# Patient Record
Sex: MALE | Race: WHITE
[De-identification: names, ages, dates, MRNs, and addresses within clinical notes are randomized per-mention and may not be internally consistent; named-entity substitution may affect disease eponyms.]

---

## 2017-01-26 ENCOUNTER — HOSPITAL ENCOUNTER (OUTPATIENT)
Dept: HOSPITAL 75 - CARD | Age: 82
End: 2017-01-26
Attending: INTERNAL MEDICINE
Payer: MEDICARE

## 2017-01-26 VITALS — DIASTOLIC BLOOD PRESSURE: 81 MMHG | SYSTOLIC BLOOD PRESSURE: 164 MMHG

## 2017-01-26 DIAGNOSIS — R06.09: ICD-10-CM

## 2017-01-26 DIAGNOSIS — I25.10: Primary | ICD-10-CM

## 2017-01-26 DIAGNOSIS — N18.3: ICD-10-CM

## 2017-01-26 DIAGNOSIS — I12.9: ICD-10-CM

## 2017-01-26 DIAGNOSIS — E11.9: ICD-10-CM

## 2017-01-26 DIAGNOSIS — I42.0: ICD-10-CM

## 2017-01-26 PROCEDURE — 78452 HT MUSCLE IMAGE SPECT MULT: CPT

## 2017-01-26 PROCEDURE — 93017 CV STRESS TEST TRACING ONLY: CPT

## 2017-01-26 RX ADMIN — Medication PRN ML: at 08:25

## 2017-01-26 RX ADMIN — Medication PRN ML: at 09:37

## 2017-01-28 NOTE — STRESS TEST
PROCEDURE PHYSICIAN:   ELIAS HINDS

 

RESTING AND POST REGADENOSON TECHNETIUM 99M TETROFOSMIN SPECT CT

IMAGING: 

 

DATE OF PROCEDURE:  

01/26/2017

 

ORDERING PHYSICIAN: 

Dr. Hinds  

 

PRIMARY PHYSICIAN:

Dr. Nelson. 

 

OTHER PHYSICIAN:

Coronary artery disease, shortness of breath. 

 

Baseline images were carried out with injection of 10.33 mCi

technetium 99m tetrofosmin. This was followed by 0.4 mg

regadenoson and 29.5 mCi technetium 99m tetrofosmin for stress

imaging. The electrocardiogram showed sinus rhythm with left

anterior fascicular block at baseline. This did not change

significantly with regadenoson infusion. He tolerated the

procedure well and did not report symptoms. Review of images at

rest and following stress, does not indicate any distinct

perfusion defects consistent with significant myocardial ischemia

or infarction. There is some degree of diaphragmatic attenuation

seen both at rest and following regadenoson infusion. Left

ventricular ejection fraction on gated images is calculated to be

47%. 

 

CONCLUSION:

1.   No evidence of myocardial ischemia or infarction on this

study. 

2.   No distinct regional wall motion abnormalities. 

3.   Left ventricular ejection fraction is calculated to be 47%. 

 

Job ID: [id number]

Dictated Date: 01/27/2017 11:49:23 

Transcription Date: 01/28/2017 11:53:44 / anthony

## 2017-02-27 ENCOUNTER — HOSPITAL ENCOUNTER (OUTPATIENT)
Dept: HOSPITAL 75 - RT | Age: 82
End: 2017-02-27
Attending: INTERNAL MEDICINE
Payer: MEDICARE

## 2017-02-27 DIAGNOSIS — E66.09: ICD-10-CM

## 2017-02-27 DIAGNOSIS — I12.9: ICD-10-CM

## 2017-02-27 DIAGNOSIS — I42.0: ICD-10-CM

## 2017-02-27 DIAGNOSIS — N18.3: ICD-10-CM

## 2017-02-27 DIAGNOSIS — E11.9: ICD-10-CM

## 2017-02-27 DIAGNOSIS — I25.10: ICD-10-CM

## 2017-02-27 DIAGNOSIS — E78.4: Primary | ICD-10-CM

## 2017-02-27 DIAGNOSIS — R06.02: ICD-10-CM

## 2017-02-27 PROCEDURE — 94726 PLETHYSMOGRAPHY LUNG VOLUMES: CPT

## 2017-02-27 PROCEDURE — 94640 AIRWAY INHALATION TREATMENT: CPT

## 2017-02-27 PROCEDURE — 94060 EVALUATION OF WHEEZING: CPT

## 2017-02-27 PROCEDURE — 94729 DIFFUSING CAPACITY: CPT
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
## 2017-05-11 ENCOUNTER — HOSPITAL ENCOUNTER (OUTPATIENT)
Dept: HOSPITAL 75 - SLEEP | Age: 82
Discharge: HOME | End: 2017-05-11
Attending: NURSE PRACTITIONER
Payer: MEDICARE

## 2017-05-11 DIAGNOSIS — G47.33: Primary | ICD-10-CM

## 2019-06-18 ENCOUNTER — HOSPITAL ENCOUNTER (OUTPATIENT)
Dept: HOSPITAL 75 - CARD | Age: 84
End: 2019-06-18
Attending: INTERNAL MEDICINE
Payer: MEDICARE

## 2019-06-18 VITALS — WEIGHT: 241 LBS | HEIGHT: 68 IN | BODY MASS INDEX: 36.53 KG/M2

## 2019-06-18 VITALS — SYSTOLIC BLOOD PRESSURE: 177 MMHG | DIASTOLIC BLOOD PRESSURE: 94 MMHG

## 2019-06-18 VITALS — SYSTOLIC BLOOD PRESSURE: 213 MMHG | DIASTOLIC BLOOD PRESSURE: 99 MMHG

## 2019-06-18 DIAGNOSIS — N18.3: ICD-10-CM

## 2019-06-18 DIAGNOSIS — E11.22: ICD-10-CM

## 2019-06-18 DIAGNOSIS — I42.0: ICD-10-CM

## 2019-06-18 DIAGNOSIS — I77.89: ICD-10-CM

## 2019-06-18 DIAGNOSIS — I12.9: ICD-10-CM

## 2019-06-18 DIAGNOSIS — E78.5: ICD-10-CM

## 2019-06-18 DIAGNOSIS — I25.10: Primary | ICD-10-CM

## 2019-06-18 DIAGNOSIS — R06.09: ICD-10-CM

## 2019-06-18 PROCEDURE — 93017 CV STRESS TEST TRACING ONLY: CPT

## 2019-06-18 PROCEDURE — 78452 HT MUSCLE IMAGE SPECT MULT: CPT

## 2019-06-18 NOTE — STRESS TEST
DATE OF SERVICE:  06/18/2019



RESTING AND POST REGADENOSON TECHNETIUM-99M TETROFOSMIN SPECT CT IMAGING



ORDERING PHYSICIAN:

Edgardo Hinds MD, MA, FACP, FACC.



CLINICAL DIAGNOSES:

Coronary artery disease, nonischemic cardiomyopathy, diabetes, chronic kidney

disease, shortness of breath.



Baseline images were carried out after injection of 10.84 mCi of technetium-99m

Tetrofosmin.  This was followed by 0.4 mg regadenoson and 33 mCi of

technetium-99m Tetrofosmin for stress imaging.  The electrocardiogram showed

sinus rhythm and left bundle branch block at baseline.  Isolated premature

ventricular contractions were seen, sometimes in a bigeminal pattern.  There

were no runs of ventricular tachycardia.  The patient tolerated the procedure

well.  Review of images at rest and following stress does not indicate any

significant perfusion defects consistent with myocardial ischemia or infarction.

 Gated images show mild global hypokinesis.  Left ventricular ejection fraction

is calculated to be 44%.



CONCLUSIONS:

1.  No evidence of myocardial ischemia or infarction on this study.

2.  Mild impairment of global left ventricular systolic function with a

calculated ejection fraction of 44%.

3.  No regional wall motion abnormality is seen on this study.





Job ID: [id number]

DocumentID: [id number]

Dictated Date:  06/18/2019 19:07:21

Transcription Date: 06/18/2019 21:32:11

Dictated By: EDGARDO HINDS MD, MA, FACP, FACC,

## 2019-12-09 ENCOUNTER — HOSPITAL ENCOUNTER (OUTPATIENT)
Dept: HOSPITAL 75 - CARD | Age: 84
End: 2019-12-09
Attending: INTERNAL MEDICINE
Payer: MEDICARE

## 2019-12-09 DIAGNOSIS — I65.29: ICD-10-CM

## 2019-12-09 DIAGNOSIS — I13.10: ICD-10-CM

## 2019-12-09 DIAGNOSIS — E78.5: ICD-10-CM

## 2019-12-09 DIAGNOSIS — G47.33: ICD-10-CM

## 2019-12-09 DIAGNOSIS — I25.10: Primary | ICD-10-CM

## 2019-12-09 DIAGNOSIS — I43: ICD-10-CM

## 2019-12-09 DIAGNOSIS — N18.3: ICD-10-CM

## 2019-12-09 DIAGNOSIS — E11.22: ICD-10-CM

## 2019-12-09 PROCEDURE — 93306 TTE W/DOPPLER COMPLETE: CPT

## 2021-03-19 ENCOUNTER — HOSPITAL ENCOUNTER (OUTPATIENT)
Dept: HOSPITAL 75 - CARD | Age: 86
End: 2021-03-19
Attending: INTERNAL MEDICINE
Payer: MEDICARE

## 2021-03-19 DIAGNOSIS — I42.0: Primary | ICD-10-CM

## 2021-03-19 PROCEDURE — 93306 TTE W/DOPPLER COMPLETE: CPT

## 2021-03-23 ENCOUNTER — HOSPITAL ENCOUNTER (OUTPATIENT)
Dept: HOSPITAL 75 - CARD | Age: 86
End: 2021-03-23
Attending: INTERNAL MEDICINE
Payer: MEDICARE

## 2021-03-23 VITALS — DIASTOLIC BLOOD PRESSURE: 95 MMHG | SYSTOLIC BLOOD PRESSURE: 187 MMHG

## 2021-03-23 VITALS — BODY MASS INDEX: 39.79 KG/M2 | WEIGHT: 253.53 LBS | HEIGHT: 66.93 IN

## 2021-03-23 DIAGNOSIS — R06.09: Primary | ICD-10-CM

## 2021-03-23 PROCEDURE — 78452 HT MUSCLE IMAGE SPECT MULT: CPT

## 2021-03-23 PROCEDURE — 93017 CV STRESS TEST TRACING ONLY: CPT

## 2021-03-23 RX ADMIN — Medication PRN ML: at 09:42

## 2021-03-23 RX ADMIN — Medication PRN ML: at 08:13

## 2021-05-27 ENCOUNTER — HOSPITAL ENCOUNTER (OUTPATIENT)
Dept: HOSPITAL 75 - PREOP | Age: 86
LOS: 1 days | Discharge: HOME | End: 2021-05-28
Attending: SURGERY
Payer: MEDICARE

## 2021-05-27 VITALS — BODY MASS INDEX: 40.03 KG/M2 | HEIGHT: 67.99 IN | WEIGHT: 264.11 LBS

## 2021-05-27 DIAGNOSIS — Z01.818: Primary | ICD-10-CM

## 2021-09-08 ENCOUNTER — HOSPITAL ENCOUNTER (OUTPATIENT)
Dept: HOSPITAL 75 - LAB | Age: 86
End: 2021-09-08
Attending: SURGERY
Payer: MEDICARE

## 2021-09-08 ENCOUNTER — HOSPITAL ENCOUNTER (OUTPATIENT)
Dept: HOSPITAL 75 - WOUNDCARE | Age: 86
End: 2021-09-08
Attending: SURGERY
Payer: MEDICARE

## 2021-09-08 DIAGNOSIS — L92.8: ICD-10-CM

## 2021-09-08 DIAGNOSIS — L97.212: ICD-10-CM

## 2021-09-08 DIAGNOSIS — I89.0: Primary | ICD-10-CM

## 2021-09-08 DIAGNOSIS — I70.232: ICD-10-CM

## 2021-09-08 DIAGNOSIS — E11.622: ICD-10-CM

## 2021-09-08 LAB
ALBUMIN SERPL-MCNC: 3.7 GM/DL (ref 3.2–4.5)
ALP SERPL-CCNC: 55 U/L (ref 40–136)
ALT SERPL-CCNC: 13 U/L (ref 0–55)
BASOPHILS # BLD AUTO: 0 10^3/UL (ref 0–0.1)
BASOPHILS NFR BLD AUTO: 0 % (ref 0–10)
BILIRUB SERPL-MCNC: 0.5 MG/DL (ref 0.1–1)
BUN/CREAT SERPL: 14
CALCIUM SERPL-MCNC: 9.2 MG/DL (ref 8.5–10.1)
CHLORIDE SERPL-SCNC: 97 MMOL/L (ref 98–107)
CO2 SERPL-SCNC: 28 MMOL/L (ref 21–32)
CREAT SERPL-MCNC: 1.52 MG/DL (ref 0.6–1.3)
EOSINOPHIL # BLD AUTO: 0 10^3/UL (ref 0–0.3)
EOSINOPHIL NFR BLD AUTO: 1 % (ref 0–10)
GFR SERPLBLD BASED ON 1.73 SQ M-ARVRAT: 44 ML/MIN
GLUCOSE SERPL-MCNC: 195 MG/DL (ref 70–105)
HCT VFR BLD CALC: 39 % (ref 40–54)
HGB BLD-MCNC: 13.2 G/DL (ref 13.3–17.7)
LYMPHOCYTES # BLD AUTO: 1.1 10^3/UL (ref 1–4)
LYMPHOCYTES NFR BLD AUTO: 20 % (ref 12–44)
MANUAL DIFFERENTIAL PERFORMED BLD QL: NO
MCH RBC QN AUTO: 30 PG (ref 25–34)
MCHC RBC AUTO-ENTMCNC: 34 G/DL (ref 32–36)
MCV RBC AUTO: 88 FL (ref 80–99)
MONOCYTES # BLD AUTO: 0.7 10^3/UL (ref 0–1)
MONOCYTES NFR BLD AUTO: 13 % (ref 0–12)
NEUTROPHILS # BLD AUTO: 3.6 10^3/UL (ref 1.8–7.8)
NEUTROPHILS NFR BLD AUTO: 66 % (ref 42–75)
PLATELET # BLD: 143 10^3/UL (ref 130–400)
PMV BLD AUTO: 9.6 FL (ref 9–12.2)
POTASSIUM SERPL-SCNC: 3.7 MMOL/L (ref 3.6–5)
PROT SERPL-MCNC: 7.2 GM/DL (ref 6.4–8.2)
SODIUM SERPL-SCNC: 134 MMOL/L (ref 135–145)
WBC # BLD AUTO: 5.5 10^3/UL (ref 4.3–11)

## 2021-09-08 PROCEDURE — 36415 COLL VENOUS BLD VENIPUNCTURE: CPT

## 2021-09-08 PROCEDURE — 85025 COMPLETE CBC W/AUTO DIFF WBC: CPT

## 2021-09-08 PROCEDURE — 99214 OFFICE O/P EST MOD 30 MIN: CPT

## 2021-09-08 PROCEDURE — 83036 HEMOGLOBIN GLYCOSYLATED A1C: CPT

## 2021-09-08 PROCEDURE — 80053 COMPREHEN METABOLIC PANEL: CPT

## 2021-09-16 ENCOUNTER — HOSPITAL ENCOUNTER (OUTPATIENT)
Dept: HOSPITAL 75 - WOUNDCARE | Age: 86
End: 2021-09-16
Attending: SURGERY
Payer: MEDICARE

## 2021-09-16 DIAGNOSIS — I89.0: Primary | ICD-10-CM

## 2021-09-16 DIAGNOSIS — E11.52: ICD-10-CM

## 2021-09-16 DIAGNOSIS — E11.622: ICD-10-CM

## 2021-09-16 DIAGNOSIS — L97.211: ICD-10-CM

## 2021-09-16 PROCEDURE — 99212 OFFICE O/P EST SF 10 MIN: CPT

## 2021-09-21 ENCOUNTER — HOSPITAL ENCOUNTER (OUTPATIENT)
Dept: HOSPITAL 75 - WOUNDCARE | Age: 86
End: 2021-09-21
Attending: SURGERY
Payer: MEDICARE

## 2021-09-21 DIAGNOSIS — L97.211: ICD-10-CM

## 2021-09-21 DIAGNOSIS — E11.52: ICD-10-CM

## 2021-09-21 DIAGNOSIS — E11.622: ICD-10-CM

## 2021-09-21 DIAGNOSIS — I89.0: Primary | ICD-10-CM

## 2021-09-21 PROCEDURE — 99212 OFFICE O/P EST SF 10 MIN: CPT

## 2022-01-10 ENCOUNTER — HOSPITAL ENCOUNTER (INPATIENT)
Dept: HOSPITAL 75 - ER | Age: 87
LOS: 7 days | Discharge: HOME HEALTH | DRG: 871 | End: 2022-01-17
Attending: INTERNAL MEDICINE | Admitting: INTERNAL MEDICINE
Payer: MEDICARE

## 2022-01-10 VITALS — HEIGHT: 67.99 IN | WEIGHT: 250.45 LBS | BODY MASS INDEX: 37.96 KG/M2

## 2022-01-10 DIAGNOSIS — Z20.822: ICD-10-CM

## 2022-01-10 DIAGNOSIS — A41.1: Primary | ICD-10-CM

## 2022-01-10 DIAGNOSIS — Z86.73: ICD-10-CM

## 2022-01-10 DIAGNOSIS — E03.9: ICD-10-CM

## 2022-01-10 DIAGNOSIS — Z87.891: ICD-10-CM

## 2022-01-10 DIAGNOSIS — I12.9: ICD-10-CM

## 2022-01-10 DIAGNOSIS — I65.23: ICD-10-CM

## 2022-01-10 DIAGNOSIS — H54.7: ICD-10-CM

## 2022-01-10 DIAGNOSIS — G47.33: ICD-10-CM

## 2022-01-10 DIAGNOSIS — E78.00: ICD-10-CM

## 2022-01-10 DIAGNOSIS — F32.A: ICD-10-CM

## 2022-01-10 DIAGNOSIS — I25.10: ICD-10-CM

## 2022-01-10 DIAGNOSIS — J18.9: ICD-10-CM

## 2022-01-10 DIAGNOSIS — I87.2: ICD-10-CM

## 2022-01-10 DIAGNOSIS — I21.A1: ICD-10-CM

## 2022-01-10 DIAGNOSIS — K21.9: ICD-10-CM

## 2022-01-10 DIAGNOSIS — E11.65: ICD-10-CM

## 2022-01-10 DIAGNOSIS — E78.5: ICD-10-CM

## 2022-01-10 DIAGNOSIS — N18.30: ICD-10-CM

## 2022-01-10 DIAGNOSIS — Z79.4: ICD-10-CM

## 2022-01-10 DIAGNOSIS — E11.22: ICD-10-CM

## 2022-01-10 DIAGNOSIS — N39.0: ICD-10-CM

## 2022-01-10 LAB
ALBUMIN SERPL-MCNC: 3.8 GM/DL (ref 3.2–4.5)
ALP SERPL-CCNC: 73 U/L (ref 40–136)
ALT SERPL-CCNC: 12 U/L (ref 0–55)
APTT BLD: 41 SEC (ref 24–35)
APTT PPP: YELLOW S
BACTERIA #/AREA URNS HPF: (no result) /HPF
BASOPHILS # BLD AUTO: 0 10^3/UL (ref 0–0.1)
BASOPHILS NFR BLD AUTO: 0 % (ref 0–10)
BILIRUB SERPL-MCNC: 1.6 MG/DL (ref 0.1–1)
BILIRUB UR QL STRIP: NEGATIVE
BUN/CREAT SERPL: 14
CALCIUM SERPL-MCNC: 9.3 MG/DL (ref 8.5–10.1)
CHLORIDE SERPL-SCNC: 93 MMOL/L (ref 98–107)
CO2 SERPL-SCNC: 25 MMOL/L (ref 21–32)
CREAT SERPL-MCNC: 1.43 MG/DL (ref 0.6–1.3)
D DIMER PPP FEU-MCNC: 1.76 UG/ML (ref 0–0.49)
EOSINOPHIL # BLD AUTO: 0 10^3/UL (ref 0–0.3)
EOSINOPHIL NFR BLD AUTO: 0 % (ref 0–10)
FIBRINOGEN PPP-MCNC: CLEAR MG/DL
GFR SERPLBLD BASED ON 1.73 SQ M-ARVRAT: 47 ML/MIN
GLUCOSE SERPL-MCNC: 266 MG/DL (ref 70–105)
GLUCOSE UR STRIP-MCNC: (no result) MG/DL
HCT VFR BLD CALC: 40 % (ref 40–54)
HGB BLD-MCNC: 13.6 G/DL (ref 13.3–17.7)
INR PPP: 1.2 (ref 0.8–1.4)
KETONES UR QL STRIP: NEGATIVE
LEUKOCYTE ESTERASE UR QL STRIP: NEGATIVE
LYMPHOCYTES # BLD AUTO: 2 10^3/UL (ref 1–4)
LYMPHOCYTES NFR BLD AUTO: 17 % (ref 12–44)
MANUAL DIFFERENTIAL PERFORMED BLD QL: NO
MCH RBC QN AUTO: 30 PG (ref 25–34)
MCHC RBC AUTO-ENTMCNC: 34 G/DL (ref 32–36)
MCV RBC AUTO: 87 FL (ref 80–99)
MONOCYTES # BLD AUTO: 1.2 10^3/UL (ref 0–1)
MONOCYTES NFR BLD AUTO: 11 % (ref 0–12)
NEUTROPHILS # BLD AUTO: 8.1 10^3/UL (ref 1.8–7.8)
NEUTROPHILS NFR BLD AUTO: 71 % (ref 42–75)
NITRITE UR QL STRIP: NEGATIVE
PH UR STRIP: 6 [PH] (ref 5–9)
PLATELET # BLD: 182 10^3/UL (ref 130–400)
PMV BLD AUTO: 10.1 FL (ref 9–12.2)
POTASSIUM SERPL-SCNC: 4.3 MMOL/L (ref 3.6–5)
PROT SERPL-MCNC: 8.1 GM/DL (ref 6.4–8.2)
PROT UR QL STRIP: (no result)
PROTHROMBIN TIME: 15.2 SEC (ref 12.2–14.7)
RBC #/AREA URNS HPF: (no result) /HPF
SODIUM SERPL-SCNC: 128 MMOL/L (ref 135–145)
SP GR UR STRIP: 1.02 (ref 1.02–1.02)
SQUAMOUS #/AREA URNS HPF: (no result) /HPF
T4 FREE SERPL-MCNC: 1.31 NG/DL (ref 0.7–1.48)
WBC # BLD AUTO: 11.5 10^3/UL (ref 4.3–11)

## 2022-01-10 PROCEDURE — 80053 COMPREHEN METABOLIC PANEL: CPT

## 2022-01-10 PROCEDURE — 87636 SARSCOV2 & INF A&B AMP PRB: CPT

## 2022-01-10 PROCEDURE — 71045 X-RAY EXAM CHEST 1 VIEW: CPT

## 2022-01-10 PROCEDURE — 83605 ASSAY OF LACTIC ACID: CPT

## 2022-01-10 PROCEDURE — 82947 ASSAY GLUCOSE BLOOD QUANT: CPT

## 2022-01-10 PROCEDURE — 85730 THROMBOPLASTIN TIME PARTIAL: CPT

## 2022-01-10 PROCEDURE — 70450 CT HEAD/BRAIN W/O DYE: CPT

## 2022-01-10 PROCEDURE — 93041 RHYTHM ECG TRACING: CPT

## 2022-01-10 PROCEDURE — 80061 LIPID PANEL: CPT

## 2022-01-10 PROCEDURE — 94760 N-INVAS EAR/PLS OXIMETRY 1: CPT

## 2022-01-10 PROCEDURE — 87081 CULTURE SCREEN ONLY: CPT

## 2022-01-10 PROCEDURE — 51701 INSERT BLADDER CATHETER: CPT

## 2022-01-10 PROCEDURE — 85025 COMPLETE CBC W/AUTO DIFF WBC: CPT

## 2022-01-10 PROCEDURE — 72170 X-RAY EXAM OF PELVIS: CPT

## 2022-01-10 PROCEDURE — 86141 C-REACTIVE PROTEIN HS: CPT

## 2022-01-10 PROCEDURE — 36415 COLL VENOUS BLD VENIPUNCTURE: CPT

## 2022-01-10 PROCEDURE — 85610 PROTHROMBIN TIME: CPT

## 2022-01-10 PROCEDURE — 84100 ASSAY OF PHOSPHORUS: CPT

## 2022-01-10 PROCEDURE — 85379 FIBRIN DEGRADATION QUANT: CPT

## 2022-01-10 PROCEDURE — 84145 PROCALCITONIN (PCT): CPT

## 2022-01-10 PROCEDURE — 94761 N-INVAS EAR/PLS OXIMETRY MLT: CPT

## 2022-01-10 PROCEDURE — 93306 TTE W/DOPPLER COMPLETE: CPT

## 2022-01-10 PROCEDURE — 83880 ASSAY OF NATRIURETIC PEPTIDE: CPT

## 2022-01-10 PROCEDURE — 87077 CULTURE AEROBIC IDENTIFY: CPT

## 2022-01-10 PROCEDURE — 84484 ASSAY OF TROPONIN QUANT: CPT

## 2022-01-10 PROCEDURE — 87186 SC STD MICRODIL/AGAR DIL: CPT

## 2022-01-10 PROCEDURE — 84443 ASSAY THYROID STIM HORMONE: CPT

## 2022-01-10 PROCEDURE — 87088 URINE BACTERIA CULTURE: CPT

## 2022-01-10 PROCEDURE — 83735 ASSAY OF MAGNESIUM: CPT

## 2022-01-10 PROCEDURE — 93005 ELECTROCARDIOGRAM TRACING: CPT

## 2022-01-10 PROCEDURE — 81000 URINALYSIS NONAUTO W/SCOPE: CPT

## 2022-01-10 PROCEDURE — 72125 CT NECK SPINE W/O DYE: CPT

## 2022-01-10 PROCEDURE — 87040 BLOOD CULTURE FOR BACTERIA: CPT

## 2022-01-10 PROCEDURE — 84439 ASSAY OF FREE THYROXINE: CPT

## 2022-01-10 PROCEDURE — 94640 AIRWAY INHALATION TREATMENT: CPT

## 2022-01-10 NOTE — DIAGNOSTIC IMAGING REPORT
INDICATION: Confused, cough.



COMPARISON: None.



FINDINGS: Single view of the chest demonstrates cardiac

enlargement without overt pulmonary edema. There is an infiltrate

in the left base. The right lung is clear. There is no

pneumothorax. Osseous structures normal.



IMPRESSION: Infiltrate in the left lung base. 



Dictated by: 



  Dictated on workstation # [id number]

## 2022-01-10 NOTE — DIAGNOSTIC IMAGING REPORT
INDICATION: Confused, fall.



COMPARISON: None 



FINDINGS: Single view of the pelvis demonstrates no fracture or

dislocation. Articular surfaces are age-appropriate. Mild

constipation is present.



IMPRESSION: Negative pelvis.



Dictated by: 



  Dictated on workstation # [id number]

## 2022-01-10 NOTE — ED NEUROLOGICAL PROBLEM
General


Chief Complaint:  Neuro-Stroke Like Symptoms


Stated Complaint:  FEVER,COUGH,SOB,SLURRED SPEECH,WEAKNESS


Nursing Triage Note:  


ARRIVED VIA WC TO ROOM 03.  WIFE STATES HE WOKE UP THIS AM ET NOT THIS AM NOT 


HIMSELF.  CONFUSED, FEVER, COUGH. WIFE REPORTS HE FELL TODAY ET UNKNOWN IF HE 


HIT HIS HEAD.


Source:  patient


Exam Limitations:  no limitations





History of Present Illness


Date Seen by Provider:  Curt 10, 2022


Time Seen by Provider:  18:24


Initial Comments


This 86-year-old gentleman presents to the emergency room accompanied by his 

wife with concerns about fever, chronic cough, shortness of breath, slurred 

speech, generalized weakness, and fall in the home.  He woke this morning with 

slurred speech that his wife noted when she made breakfast around 08 30.  She 

notes that he later had a temperature of 101.4.  He is afebrile right now but 

feels warm to the touch.  He is normally alert, oriented, and conversational.  

Today he seems confused and trails off during conversation.  He is disoriented 

to month and year.  He answers some questions but not others.  When asked where 

he is he states "this [employer], [facility]".  He is able to move 

all 4 extremities but seems diffusely weak.  He has severe edema from his feet 

up through his hips.  He denies any pain.  Wife is unclear about the nature of 

his fall.  She thinks he may have hurt his hip but he denies any pain or 

tenderness there.  Fingerstick blood sugar was 265.  He has mild systolic heart 

failure by echo history.





Allergies and Home Medications


Allergies


Coded Allergies:  


     No Known Drug Allergies (Unverified , 10/14/10)





Patient Home Medication List


Home Medication List Reviewed:  Yes


Clopidogrel Bisulfate (Clopidogrel) 75 Mg Tablet, 75 MG PO DAILY, (Reported)


   Entered as Reported by: JULIE A IBEH on 21


Cyanocobalamin (Vitamin B-12) (Vitamin B12) 2,500 Mcg Tablet, 2,500 MCG PO 

DAILY, (Reported)


   Entered as Reported by: JULIE A IBEH on 21


Enalapril Maleate (Enalapril Maleate) 5 Mg Tablet, 5 MG PO BID, (Reported)


   Entered as Reported by: JULIE A IBEH on 21


Furosemide (Lasix) 80 Mg Tablet, 80 MG PO DAILY, (Reported)


   Entered as Reported by: JULIE A IBEH on 21


Insulin Aspart (Novolog Flexpen) 300 Units/3 Ml Solution, 25 UNITS SQ AC, 

(Reported)


   Entered as Reported by: JULIE A IBEH on 21


Insulin Glargine,Hum.rec.anlog (Lantus Solostar) 100 Unit/1 Ml Insuln.pen, 32 

UNIT SQ HS, (Reported)


   Entered as Reported by: JULIE A IBEH on 21


Levothyroxine Sodium (Levothyroxine) 150 Mcg Capsule, 150 MCG PO DAILY, 

(Reported)


   Entered as Reported by: JULIE A IBEH on 21


Meclizine HCl (Meclizine HCl) 25 Mg Tablet, 25 MG PO TID, (Reported)


   Entered as Reported by: JULIE A IBEH on 21


Metoprolol Succinate (Metoprolol Succinate) 25 Mg Tab.er.24h, 25 MG PO DAILY, 

(Reported)


   Entered as Reported by: JULIE A IBEH on 21


Simvastatin (Simvastatin) 40 Mg Tablet, 40 MG PO HS, (Reported)


   Entered as Reported by: JULIE A IBEH on 21


Spironolactone (Spironolactone) 50 Mg Tablet, 50 MG PO BID, (Reported)


   Entered as Reported by: JULIE A IBEH on 21


Tamsulosin HCl (Flomax) 0.4 Mg Cap, 0.4 MG PO DAILY, (Reported)


   Entered as Reported by: JULIE A IBEH on 21





Review of Systems


Review of Systems


Constitutional:  see HPI


Eyes:  No Symptoms Reported


Ears, Nose, Mouth, Throat:  no symptoms reported


Respiratory:  no symptoms reported


Cardiovascular:  see HPI


Gastrointestinal:  no symptoms reported


Genitourinary:  no symptoms reported


Musculoskeletal:  no symptoms reported


Skin:  no symptoms reported


Psychiatric/Neurological:  See HPI


Endocrine:  No Symptoms Reported


Hematologic/Lymphatic:  No Symptoms Reported





Past Medical-Social-Family Hx


Patient Social History


Tobacco Use?:  No


Substance use?:  No


Alcohol Use?:  No





Immunizations Up To Date


COVID19 Vaccine :  MODERNA





Past Medical History


Surgeries:  Yes


Abdominal, Gallbladder, Orthopedic


Respiratory:  Yes


Chronic Bronchitis


Cardiac:  Yes (Mild systolic heart failure)


Chronic Edema/Swelling, Coronary Artery Disease, High Cholesterol, Hypertension


Neurological:  Yes


TIA


Reproductive Disorders:  No


Genitourinary:  Yes


Renal Failure


Gastrointestinal:  No


Musculoskeletal:  No


Endocrine:  Yes


Diabetes, Insulin dep


Cancer:  No


Psychosocial:  No


Integumentary:  No





Physical Exam


Vital Signs





Vital Signs - First Documented








 1/10/22





 18:26


 


Temp 37.0


 


Pulse 111


 


Resp 16


 


B/P (MAP) 190/102 (131)


 


Pulse Ox 94





Capillary Refill : Less Than 3 Seconds


Height, Weight, BMI


Height: 5'8.00"


Weight: 241lbs. 0.0oz. 109.289202gz; 38.00 BMI


Method:Stated


General Appearance:  WD/WN, no apparent distress, obese


HEENT:  PERRL/EOMI, other (Oropharynx dry)


Neck:  normal inspection


Respiratory:  lungs clear, normal breath sounds, no respiratory distress


Cardiovascular:  regular rate, rhythm, no murmur, other (Severe lower extremity 

edema from his feet through his hips)


Gastrointestinal:  normal bowel sounds, soft, distended, tenderness (Slight 

generalized tenderness, no acute abdomen)


Extremities:  swelling, other (Severe tight lower extremity edema from his feet 

through his hip)


Neurologic/Psychiatric:  alert, other (Patient is alert and answers some 

questions.  He often trails off before finishing a thought.  He seems somnolent.

 He is disoriented.  He moves all 4 extremities and has no obvious focal deficit

but seems generally weak.)


Crainal Nerves:  normal hearing, normal speech, PERRL


Skin:  normal color, warm/dry





Focused Exam


Lactate Level


1/10/22 18:40: Lactic Acid Level 1.70





Lactic Acid Level





Laboratory Tests








Test


 1/10/22


18:40


 


Lactic Acid Level


 1.70 MMOL/L


(0.50-2.00)











Progress/Results/Core Measures


Results/Orders


Lab Results





Laboratory Tests








Test


 1/10/22


18:37 1/10/22


18:40 1/10/22


18:47 Range/Units


 


 


White Blood Count


 11.5 H


 


 


 4.3-11.0


10^3/uL


 


Red Blood Count


 4.59 


 


 


 4.30-5.52


10^6/uL


 


Hemoglobin 13.6    13.3-17.7  g/dL


 


Hematocrit 40    40-54  %


 


Mean Corpuscular Volume 87    80-99  fL


 


Mean Corpuscular Hemoglobin 30    25-34  pg


 


Mean Corpuscular Hemoglobin


Concent 34 


 


 


 32-36  g/dL





 


Red Cell Distribution Width 12.2    10.0-14.5  %


 


Platelet Count


 182 


 


 


 130-400


10^3/uL


 


Mean Platelet Volume 10.1    9.0-12.2  fL


 


Immature Granulocyte % (Auto) 1     %


 


Neutrophils (%) (Auto) 71    42-75  %


 


Lymphocytes (%) (Auto) 17    12-44  %


 


Monocytes (%) (Auto) 11    0-12  %


 


Eosinophils (%) (Auto) 0    0-10  %


 


Basophils (%) (Auto) 0    0-10  %


 


Neutrophils # (Auto)


 8.1 H


 


 


 1.8-7.8


10^3/uL


 


Lymphocytes # (Auto)


 2.0 


 


 


 1.0-4.0


10^3/uL


 


Monocytes # (Auto)


 1.2 H


 


 


 0.0-1.0


10^3/uL


 


Eosinophils # (Auto)


 0.0 


 


 


 0.0-0.3


10^3/uL


 


Basophils # (Auto)


 0.0 


 


 


 0.0-0.1


10^3/uL


 


Immature Granulocyte # (Auto)


 0.1 


 


 


 0.0-0.1


10^3/uL


 


Prothrombin Time 15.2 H   12.2-14.7  SEC


 


INR Comment 1.2    0.8-1.4  


 


Activated Partial


Thromboplast Time 41 H


 


 


 24-35  SEC





 


D-Dimer


 1.76 H


 


 


 0.00-0.49


UG/ML


 


Sodium Level 128 L   135-145  MMOL/L


 


Potassium Level 4.3    3.6-5.0  MMOL/L


 


Chloride Level 93 L     MMOL/L


 


Carbon Dioxide Level 25    21-32  MMOL/L


 


Anion Gap 10    5-14  MMOL/L


 


Blood Urea Nitrogen 20 H   7-18  MG/DL


 


Creatinine


 1.43 H


 


 


 0.60-1.30


MG/DL


 


Estimat Glomerular Filtration


Rate 47 


 


 


  





 


BUN/Creatinine Ratio 14     


 


Glucose Level 266 H     MG/DL


 


Calcium Level 9.3    8.5-10.1  MG/DL


 


Corrected Calcium 9.5    8.5-10.1  MG/DL


 


Total Bilirubin 1.6 H   0.1-1.0  MG/DL


 


Aspartate Amino Transf


(AST/SGOT) 18 


 


 


 5-34  U/L





 


Alanine Aminotransferase


(ALT/SGPT) 12 


 


 


 0-55  U/L





 


Alkaline Phosphatase 73      U/L


 


Troponin I 0.074 H   <0.028  NG/ML


 


C-Reactive Protein High


Sensitivity 31.22 H


 


 


 0.00-0.50


MG/DL


 


B-Type Natriuretic Peptide 178.0 H   <100.0  PG/ML


 


Total Protein 8.1    6.4-8.2  GM/DL


 


Albumin 3.8    3.2-4.5  GM/DL


 


Procalcitonin 0.10 H   <0.10  NG/ML


 


Thyroid Stimulating Hormone


(TSH) 1.32 


 


 


 0.35-4.94


UIU/ML


 


Free Thyroxine


 1.31 


 


 


 0.70-1.48


NG/DL


 


Influenza Type A (RT-PCR) Not Detected    Not Detecte  


 


Influenza Type B (RT-PCR) Not Detected    Not Detecte  


 


SARS-CoV-2 RNA (RT-PCR) Not Detected    Not Detecte  


 


Glucometer  265 H    MG/DL


 


Lactic Acid Level


 


 1.70 


 


 0.50-2.00


MMOL/L


 


Urine Color   YELLOW   


 


Urine Clarity   CLEAR   


 


Urine pH   6.0  5-9  


 


Urine Specific Gravity   1.025 H 1.016-1.022  


 


Urine Protein   2+ H NEGATIVE  


 


Urine Glucose (UA)   2+ H NEGATIVE  


 


Urine Ketones   NEGATIVE  NEGATIVE  


 


Urine Nitrite   NEGATIVE  NEGATIVE  


 


Urine Bilirubin   NEGATIVE  NEGATIVE  


 


Urine Urobilinogen   2.0  < = 1.0  MG/DL


 


Urine Leukocyte Esterase   NEGATIVE  NEGATIVE  


 


Urine RBC (Auto)   2+ H NEGATIVE  


 


Urine RBC   0-2   /HPF


 


Urine WBC   10-25 H  /HPF


 


Urine Squamous Epithelial


Cells 


 


 NONE 


  /HPF





 


Urine Crystals   NONE   /LPF


 


Urine Bacteria   FEW H  /HPF


 


Urine Casts   NONE   /LPF


 


Urine Mucus   NEGATIVE   /LPF


 


Urine Culture Indicated   YES   








My Orders





Orders - SINCERE HAMM MD


Covid 19 Inhouse Test (1/10/22 18:25)


Influenza A And B By Pcr (1/10/22 18:25)


Cbc With Automated Diff (1/10/22 18:25)


Protime With Inr (1/10/22 18:25)


Partial Thromboplastin Time (1/10/22 18:25)


Comprehensive Metabolic Panel (1/10/22 18:25)


Fibrin Degradation Products (1/10/22 18:25)


Troponin I Reji (1/10/22 18:25)


Ua Culture If Indicated (1/10/22 18:25)


Chest 1 View, Ap/Pa Only (1/10/22 18:25)


Catheter(Urinary) Insert & Ass 03,15 (1/10/22 18:25)


Ekg Tracing (1/10/22 18:25)


Nothing By Mouth (1/10/22 Dinner)


Accucheck Stat ONCE (1/10/22 18:25)


Ed Iv/Invasive Line Start (1/10/22 18:25)


Ed Iv/Invasive Line Start (1/10/22 18:25)


Vital Signs Stroke Patient Q15M (1/10/22 18:25)


Ct Head Wo-R/O Stroke (1/10/22 18:25)


O2 (1/10/22 18:25)


Intake & Output 06,14,22 (1/10/22 18:25)


Monitor-Rhythm Ecg Trace Only (1/10/22 18:25)


Dysphagia Screening Tool (1/10/22 18:25)


Post Thrombolytic Adminstratio (1/10/22 18:25)


Lipid Panel (22 06:00)


Blood Culture (1/10/22 18:44)


Sputum Culture (1/10/22 18:44)


Vital Signs Adult Sepsis Patie Q15M (1/10/22 18:44)


Remove Rings In Anticipation O (1/10/22 18:44)


Lactic Acid Analyzer (1/10/22 18:44)


Bnp Reji (1/10/22 18:45)


Hs C Reactive Protein (1/10/22 18:45)


Procalcitonin (Pct) (1/10/22 18:45)


Pelvis (1/10/22 18:49)


Ct Cervical Spine Wo (1/10/22 18:49)


Urine Culture (1/10/22 18:47)


Piperacillin Sodium/Tazobactam (Zosyn Vi (1/10/22 20:15)


Thyroid Stimulating Hormone (1/10/22 20:32)


Free T4 (Free Thyroxine) (1/10/22 20:32)





Medications Given in ED





Current Medications








 Medications  Dose


 Ordered  Sig/Jami


 Route  Start Time


 Stop Time Status Last Admin


Dose Admin


 


 Piperacillin Sod/


 Tazobactam Sod


 4.5 gm/Sodium


 Chloride  100 ml @ 


 200 mls/hr  ONCE  ONCE


 IV  1/10/22 20:15


 1/10/22 20:44 DC 1/10/22 20:39


200 MLS/HR








Vital Signs/I&O











 1/10/22





 18:26


 


Temp 37.0


 


Pulse 111


 


Resp 16


 


B/P (MAP) 190/102 (131)


 


Pulse Ox 94














Blood Pressure Mean:                    131











FSBG Bedside Testing


Finger Stick Blood Glucose:  265





Progress


Progress Note #1:  


   Time:  19:04


Progress Note


Patient was seen and examined.  We are currently pursuing both sepsis and stroke

work-up.  Covid/influenza swab was obtained and is pending.  Patient is not a 

TPA candidate as this is a waking presentation with last known well time 

sometime last night.


Progress Note #2:  


   Time:  23:01


Progress Note


Although stroke activation was paged out, no specific focal deficits were 

identified.  Cognition improved when he was placed on nasal cannula, which is 

interesting because he was not hypoxic on room air.  He does normally wear 

oxygen continuously at home.  Patient was found to have multiple sources of 

infection including possible left lower lobe pneumonia and urinary tract 

infection.  He was treated with Zosyn in the ER.  There was also question of 

right mastoiditis on CT of the cervical spine.  However, patient had no pain or 

tenderness over the right mastoid.  Patient was noted to have an elevated 

troponin.  This was discussed with Dr. Ward.  He requested a repeat troponin in

the morning as well as an echocardiogram.  Patient had elevated D-dimer.  This 

was presumptively treated with Lovenox.  Creatinine was marginal causing 

hesitance to obtain CT angiogram at this time.  This was discussed with Dr. Rogel who requested we hold off on the angiogram since he is getting Lovenox at

the request of Dr. Ward anyway.  Patient also received aspirin in the ER.  

Patient was noted to be severely edematous.  However, we are holding off on 

diuresis at this time due to no evidence of pulmonary edema, caution about 

possible developing sepsis, and his marginal renal function.  Patient was 

evaluated from a trauma perspective regarding his fall.  CT of the head and C-

spine as well as x-ray of the pelvis were obtained.  No injuries were identified

and he was cleared from a trauma perspective in the ER.  No trauma consult was 

necessary.  Patient remained stable through his ER stay and cognition was 

definitely improved at the time of admission.  I did discuss CODE STATUS with 

patient and his wife.  He was very indecisive about his choice.  As a default he

will be full code at this time but may wish to change that in the near future.  

I also discussed any contraindications to his Lovenox therapy.  His wife states 

he does not get severe nosebleeds from time to time but he has not had any 

recent procedures, acute bleeding, or life-threatening bleeds.


Initial ECG Impression Date:  Curt 10, 2022


Initial ECG Impression Time:  18:42


Initial ECG Rate:  109


Comment


Left bundle branch block.  No overt ischemia.  No axis deviation.





Diagnostic Imaging





   Diagonstic Imaging:  CT


   Plain Films/CT/US/NM/MRI:  head


Comments


CT head viewed by me and report reviewed.  See report below





NAME:   TANI ALEJANDRO


[facility] REC#:   [id number]


ACCOUNT#:   [id number]


PT STATUS:   REG ER


:   1935


PHYSICIAN:   SINCERE HAMM MD


ADMIT DATE:   01/10/22/ER


                                   ***Draft***


Date of Exam:01/10/22





CT HEAD WO-R/O STROKE





PROCEDURE: CT head wo r/o stroke.





TECHNIQUE: Multiple contiguous axial images were obtained through


the brain without the use of intravenous contrast. Auto Exposure


Controls were utilized during the CT exam to meet ALARA standards


for radiation dose reduction. 





INDICATION: Confusion, stroke.





COMPARISON: None 





FINDINGS:





There is mild age-related cerebral volume loss and chronic


microvascular changes. There is no focus of acute ischemia or


hemorrhage. No dense vessel sign is seen. No extra-axial fluid


collection or mass is seen. The ventricular size is age


appropriate.





The bony calvarium is normal. There is moderate mucosal


thickening of the paranasal sinuses. Nonspecific effusion is seen


in the right mastoid air cell.





IMPRESSION:


1. No acute intracranial abnormalities. 


2. Sinus disease.





  Dictated on workstation # [id number]





Dict:   01/10/22 1930


Trans:   01/10/22 1940


[facility] [id number]





Interpreted by:     KEVIN AVENDAÑO








   Diagonstic Imaging:  CT


   Plain Films/CT/US/NM/MRI:  c-spine


Comments


CT cervical spine viewed by me and report reviewed.  See report below:





NAME:   TANI ALEJANDRO


[facility] REC#:   [id number]


ACCOUNT#:   [id number]


PT STATUS:   REG ER


:   1935


PHYSICIAN:   SINCERE HAMM MD


ADMIT DATE:   01/10/22/ER


***Draft***


Date of Exam:01/10/22





CT CERVICAL SPINE WO








PROCEDURE:  CT cervical spine without contrast.





TECHNIQUE: Multiple contiguous axial images were obtained through


the cervical spine without the use of intravenous contrast.


Sagittal and coronal reformations were then performed. Auto


Exposure Controls were utilized during the CT exam to meet ALARA


standards for radiation dose reduction. 





INDICATION: Confusion. Altered mental status. Fever. Cough. Fall.


Pain.





COMPARISON: None





FINDINGS: Static alignment of the cervical spine is maintained.


There is no significant anteroretrolisthesis. There is no


evidence of jumped facets.





Vertebral body heights are maintained.





There is no acute fracture. No bony fragments are seen within the


spinal canal. Moderate multilevel degenerative changes are noted.


Large bridging anterior osteophytes are also present.





Pre and paravertebral soft tissue structures are unremarkable.


Note is made of opacification of the right mastoid air cells.


Calcified carotid atherosclerosis is also identified. Included


portions of the lung apices showed no additional acute


abnormalities.





IMPRESSION:


1. No acute fracture or dislocation of the cervical spine.


2. Moderate multilevel degenerative changes.


3. Opacification of the right mastoid air cells. In the correct


clinical setting, this can be seen as a sequela of underlying


mastoiditis.








  Dictated on workstation # [id number]








Dict:   01/10/22 1930


Trans:   01/10/22 1942


[facility] [id number]





Interpreted by:     BRAULIO TOSCANO MD





Departure


Communication (Admissions)


Time/Spoke to Admitting Phy:  22:40


Dr. Rogel


Time/Spoke to Consulting Phy:  22:40


Dr. Ward





Impression





   Primary Impression:  


   Altered mental status


   Qualified Codes:  R41.82 - Altered mental status, unspecified


   Additional Impressions:  


   Fall on same level


   Qualified Codes:  W18.30XA - Fall on same level, unspecified, initial 

   encounter


   Anasarca


   Elevated troponin


   Pneumonia


   Qualified Codes:  J18.9 - Pneumonia, unspecified organism


   Urinary tract infection


   Qualified Codes:  N39.0 - Urinary tract infection, site not specified


Disposition:   ADMITTED AS INPATIENT


Condition:  Improved





Admissions


Decision to Admit Reason:  Admit from ER (General)


Decision to Admit/Date:  Curt 10, 2022


Time/Decision to Admit Time:  20:10





Departure-Patient Inst.


Referrals:  


NO,LOCAL PHYSICIAN (PCP/Family)


Primary Care Physician











SINCERE HAMM MD        Curt 10, 2022 18:45

## 2022-01-10 NOTE — DIAGNOSTIC IMAGING REPORT
PROCEDURE: CT head wo r/o stroke.



TECHNIQUE: Multiple contiguous axial images were obtained through

the brain without the use of intravenous contrast. Auto Exposure

Controls were utilized during the CT exam to meet ALARA standards

for radiation dose reduction. 



INDICATION: Confusion, stroke.



COMPARISON: None 



FINDINGS:



There is mild age-related cerebral volume loss and chronic

microvascular changes. There is no focus of acute ischemia or

hemorrhage. No dense vessel sign is seen. No extra-axial fluid

collection or mass is seen. The ventricular size is age

appropriate.



The bony calvarium is normal. There is moderate mucosal

thickening of the paranasal sinuses. Nonspecific effusion is seen

in the right mastoid air cell.



IMPRESSION:

1. No acute intracranial abnormalities. 

2. Sinus disease.



Dictated by: 



  Dictated on workstation # [id number]

## 2022-01-10 NOTE — DIAGNOSTIC IMAGING REPORT
PROCEDURE:  CT cervical spine without contrast.



TECHNIQUE: Multiple contiguous axial images were obtained through

the cervical spine without the use of intravenous contrast.

Sagittal and coronal reformations were then performed. Auto

Exposure Controls were utilized during the CT exam to meet ALARA

standards for radiation dose reduction. 



INDICATION: Confusion. Altered mental status. Fever. Cough. Fall.

Pain.



COMPARISON: None



FINDINGS: Static alignment of the cervical spine is maintained.

There is no significant anteroretrolisthesis. There is no

evidence of jumped facets.



Vertebral body heights are maintained.



There is no acute fracture. No bony fragments are seen within the

spinal canal. Moderate multilevel degenerative changes are noted.

Large bridging anterior osteophytes are also present.



Pre and paravertebral soft tissue structures are unremarkable.

Note is made of opacification of the right mastoid air cells.

Calcified carotid atherosclerosis is also identified. Included

portions of the lung apices showed no additional acute

abnormalities.



IMPRESSION:

1. No acute fracture or dislocation of the cervical spine.

2. Moderate multilevel degenerative changes.

3. Opacification of the right mastoid air cells. In the correct

clinical setting, this can be seen as a sequela of underlying

mastoiditis.



Dictated by: 



  Dictated on workstation # [id number]

## 2022-01-11 VITALS — SYSTOLIC BLOOD PRESSURE: 190 MMHG | DIASTOLIC BLOOD PRESSURE: 102 MMHG

## 2022-01-11 VITALS — SYSTOLIC BLOOD PRESSURE: 153 MMHG | DIASTOLIC BLOOD PRESSURE: 70 MMHG

## 2022-01-11 VITALS — SYSTOLIC BLOOD PRESSURE: 181 MMHG | DIASTOLIC BLOOD PRESSURE: 86 MMHG

## 2022-01-11 VITALS — SYSTOLIC BLOOD PRESSURE: 200 MMHG | DIASTOLIC BLOOD PRESSURE: 98 MMHG

## 2022-01-11 LAB
ALBUMIN SERPL-MCNC: 3.1 GM/DL (ref 3.2–4.5)
ALP SERPL-CCNC: 61 U/L (ref 40–136)
ALT SERPL-CCNC: 9 U/L (ref 0–55)
BASOPHILS # BLD AUTO: 0 10^3/UL (ref 0–0.1)
BASOPHILS NFR BLD AUTO: 0 % (ref 0–10)
BILIRUB SERPL-MCNC: 1.2 MG/DL (ref 0.1–1)
BUN/CREAT SERPL: 15
CALCIUM SERPL-MCNC: 8.5 MG/DL (ref 8.5–10.1)
CHLORIDE SERPL-SCNC: 94 MMOL/L (ref 98–107)
CHOLEST SERPL-MCNC: 87 MG/DL (ref ?–200)
CO2 SERPL-SCNC: 28 MMOL/L (ref 21–32)
CREAT SERPL-MCNC: 1.45 MG/DL (ref 0.6–1.3)
EOSINOPHIL # BLD AUTO: 0 10^3/UL (ref 0–0.3)
EOSINOPHIL NFR BLD AUTO: 0 % (ref 0–10)
GFR SERPLBLD BASED ON 1.73 SQ M-ARVRAT: 46 ML/MIN
GLUCOSE SERPL-MCNC: 303 MG/DL (ref 70–105)
HCT VFR BLD CALC: 35 % (ref 40–54)
HDLC SERPL-MCNC: 36 MG/DL (ref 40–60)
HGB BLD-MCNC: 12.1 G/DL (ref 13.3–17.7)
LYMPHOCYTES # BLD AUTO: 2.8 10^3/UL (ref 1–4)
LYMPHOCYTES NFR BLD AUTO: 27 % (ref 12–44)
MAGNESIUM SERPL-MCNC: 1.5 MG/DL (ref 1.6–2.4)
MANUAL DIFFERENTIAL PERFORMED BLD QL: NO
MCH RBC QN AUTO: 30 PG (ref 25–34)
MCHC RBC AUTO-ENTMCNC: 35 G/DL (ref 32–36)
MCV RBC AUTO: 87 FL (ref 80–99)
MONOCYTES # BLD AUTO: 1.3 10^3/UL (ref 0–1)
MONOCYTES NFR BLD AUTO: 12 % (ref 0–12)
NEUTROPHILS # BLD AUTO: 6.3 10^3/UL (ref 1.8–7.8)
NEUTROPHILS NFR BLD AUTO: 60 % (ref 42–75)
PHOSPHATE SERPL-MCNC: 3.2 MG/DL (ref 2.3–4.7)
PLATELET # BLD: 161 10^3/UL (ref 130–400)
PMV BLD AUTO: 10.8 FL (ref 9–12.2)
POTASSIUM SERPL-SCNC: 4.2 MMOL/L (ref 3.6–5)
PROT SERPL-MCNC: 6.6 GM/DL (ref 6.4–8.2)
SODIUM SERPL-SCNC: 131 MMOL/L (ref 135–145)
TRIGL SERPL-MCNC: 82 MG/DL (ref ?–150)
VLDLC SERPL CALC-MCNC: 16 MG/DL (ref 5–40)
WBC # BLD AUTO: 10.5 10^3/UL (ref 4.3–11)

## 2022-01-11 RX ADMIN — Medication SCH ML: at 22:42

## 2022-01-11 RX ADMIN — Medication SCH ML: at 06:34

## 2022-01-11 RX ADMIN — ENALAPRIL MALEATE SCH MG: 5 TABLET ORAL at 20:20

## 2022-01-11 RX ADMIN — INSULIN ASPART SCH UNIT: 100 INJECTION, SOLUTION INTRAVENOUS; SUBCUTANEOUS at 23:00

## 2022-01-11 RX ADMIN — Medication SCH ML: at 12:54

## 2022-01-11 RX ADMIN — MECLIZINE HYDROCHLORIDE SCH MG: 25 TABLET ORAL at 12:53

## 2022-01-11 RX ADMIN — SODIUM CHLORIDE SCH MLS/HR: 900 INJECTION, SOLUTION INTRAVENOUS at 03:00

## 2022-01-11 RX ADMIN — MECLIZINE HYDROCHLORIDE SCH MG: 25 TABLET ORAL at 20:20

## 2022-01-11 RX ADMIN — INSULIN ASPART SCH UNIT: 100 INJECTION, SOLUTION INTRAVENOUS; SUBCUTANEOUS at 16:56

## 2022-01-11 RX ADMIN — ASPIRIN SCH MG: 81 TABLET ORAL at 09:06

## 2022-01-11 RX ADMIN — SODIUM CHLORIDE SCH MLS/HR: 900 INJECTION INTRAVENOUS at 11:12

## 2022-01-11 RX ADMIN — SODIUM CHLORIDE SCH MLS/HR: 900 INJECTION INTRAVENOUS at 03:25

## 2022-01-11 RX ADMIN — INSULIN ASPART SCH UNIT: 100 INJECTION, SOLUTION INTRAVENOUS; SUBCUTANEOUS at 06:35

## 2022-01-11 RX ADMIN — SODIUM CHLORIDE SCH MLS/HR: 900 INJECTION INTRAVENOUS at 18:08

## 2022-01-11 RX ADMIN — MAGNESIUM SULFATE IN DEXTROSE SCH MLS/HR: 10 INJECTION, SOLUTION INTRAVENOUS at 06:35

## 2022-01-11 RX ADMIN — INSULIN ASPART SCH UNIT: 100 INJECTION, SOLUTION INTRAVENOUS; SUBCUTANEOUS at 11:19

## 2022-01-11 NOTE — CONSULTATION-CARDIOLOGY
HPI-Cardiology


Cardiology Consultation


Date of Consultation


1/11/22


Date of Admission





Time Seen by Provider:  07:44


Indication:  Change in mental status





HPI


86-year-old gentleman brought by his wife for increasing shortness of breath, 

cough and fever and generalized weakness.  It was reported that he had some 

slurred speech had a temperature 101.  On my evaluation he was confused, unable 

to provide full history, disoriented, able to recognize that he is at [facility].  Disoriented to time or person.  He denied any chest pain.  No

syncope.





Home Medications & Allergies


Allergies:  


Coded Allergies:  


     No Known Drug Allergies (Unverified , 10/14/10)


Home Medication List Reviewed:  Yes





PMH-Social-Family Hx


Patient Social History


Marital Status:  


Employed/Student:  retired


Smoking Status:  Former Smoker


2nd Hand Smoke Exposure:  No


Have you traveled recently?:  No


Alcohol Use?:  No





Immunizations Up To Date


Date of Pneumonia Vaccine:  Jan 1, 2010


Date of Influenza Vaccine:  Oct 27, 2020





Past Medical History


Discussed below





Family Medical History


Family Medical Hx


Noncontributory





Review of Systems-General


Review of Systems


Constitutional:  see HPI, fever, malaise, weakness


EENTM:  see HPI


Respiratory:  no symptoms reported, see HPI, cough, dyspnea on exertion


Cardiovascular:  see HPI; No chest pain, No edema, No Hx of Intervention, No 

palpitations, No syncope, No vascular heart diseas, No other


Gastrointestinal:  no symptoms reported, see HPI


Genitourinary:  no symptoms reported, see HPI


Musculoskeletal:  no symptoms reported, see HPI


Skin:  no symptoms reported, see HPI


Psychiatric/Neurological:  See HPI





Reviewed Test Results


Reviewed Test Results


Lab





Laboratory Tests








Test


 1/10/22


18:37 1/10/22


18:40 1/10/22


18:47 1/11/22


04:15 Range/Units


 


 


White Blood Count


 11.5 H


 


 


 10.5 


 4.3-11.0


10^3/uL


 


Red Blood Count


 4.59 


 


 


 4.02 L


 4.30-5.52


10^6/uL


 


Hemoglobin 13.6    12.1 L 13.3-17.7  g/dL


 


Hematocrit 40    35 L 40-54  %


 


Mean Corpuscular Volume 87    87  80-99  fL


 


Mean Corpuscular Hemoglobin 30    30  25-34  pg


 


Mean Corpuscular Hemoglobin


Concent 34 


 


 


 35 


 32-36  g/dL





 


Red Cell Distribution Width 12.2    12.2  10.0-14.5  %


 


Platelet Count


 182 


 


 


 161 


 130-400


10^3/uL


 


Mean Platelet Volume 10.1    10.8  9.0-12.2  fL


 


Immature Granulocyte % (Auto) 1    1   %


 


Neutrophils (%) (Auto) 71    60  42-75  %


 


Lymphocytes (%) (Auto) 17    27  12-44  %


 


Monocytes (%) (Auto) 11    12  0-12  %


 


Eosinophils (%) (Auto) 0    0  0-10  %


 


Basophils (%) (Auto) 0    0  0-10  %


 


Neutrophils # (Auto)


 8.1 H


 


 


 6.3 


 1.8-7.8


10^3/uL


 


Lymphocytes # (Auto)


 2.0 


 


 


 2.8 


 1.0-4.0


10^3/uL


 


Monocytes # (Auto)


 1.2 H


 


 


 1.3 H


 0.0-1.0


10^3/uL


 


Eosinophils # (Auto)


 0.0 


 


 


 0.0 


 0.0-0.3


10^3/uL


 


Basophils # (Auto)


 0.0 


 


 


 0.0 


 0.0-0.1


10^3/uL


 


Immature Granulocyte # (Auto)


 0.1 


 


 


 0.1 


 0.0-0.1


10^3/uL


 


Prothrombin Time 15.2 H    12.2-14.7  SEC


 


INR Comment 1.2     0.8-1.4  


 


Activated Partial


Thromboplast Time 41 H


 


 


 


 24-35  SEC





 


D-Dimer


 1.76 H


 


 


 


 0.00-0.49


UG/ML


 


Sodium Level 128 L   131 L 135-145  MMOL/L


 


Potassium Level 4.3    4.2  3.6-5.0  MMOL/L


 


Chloride Level 93 L   94 L   MMOL/L


 


Carbon Dioxide Level 25    28  21-32  MMOL/L


 


Anion Gap 10    9  5-14  MMOL/L


 


Blood Urea Nitrogen 20 H   22 H 7-18  MG/DL


 


Creatinine


 1.43 H


 


 


 1.45 H


 0.60-1.30


MG/DL


 


Estimat Glomerular Filtration


Rate 47 


 


 


 46 


  





 


BUN/Creatinine Ratio 14    15   


 


Glucose Level 266 H   303 H   MG/DL


 


Calcium Level 9.3    8.5  8.5-10.1  MG/DL


 


Corrected Calcium 9.5    9.2  8.5-10.1  MG/DL


 


Total Bilirubin 1.6 H   1.2 H 0.1-1.0  MG/DL


 


Aspartate Amino Transf


(AST/SGOT) 18 


 


 


 13 


 5-34  U/L





 


Alanine Aminotransferase


(ALT/SGPT) 12 


 


 


 9 


 0-55  U/L





 


Alkaline Phosphatase 73    61    U/L


 


Troponin I 0.074 H   0.089 H <0.028  NG/ML


 


C-Reactive Protein High


Sensitivity 31.22 H


 


 


 29.34 H


 0.00-0.50


MG/DL


 


B-Type Natriuretic Peptide 178.0 H    <100.0  PG/ML


 


Total Protein 8.1    6.6  6.4-8.2  GM/DL


 


Albumin 3.8    3.1 L 3.2-4.5  GM/DL


 


Procalcitonin 0.10 H   0.15 H <0.10  NG/ML


 


Thyroid Stimulating Hormone


(TSH) 1.32 


 


 


 


 0.35-4.94


UIU/ML


 


Free Thyroxine


 1.31 


 


 


 


 0.70-1.48


NG/DL


 


Influenza Type A (RT-PCR) Not Detected     Not Detecte  


 


Influenza Type B (RT-PCR) Not Detected     Not Detecte  


 


SARS-CoV-2 RNA (RT-PCR) Not Detected     Not Detecte  


 


Glucometer  265 H     MG/DL


 


Lactic Acid Level


 


 1.70 


 


 


 0.50-2.00


MMOL/L


 


Urine Color   YELLOW    


 


Urine Clarity   CLEAR    


 


Urine pH   6.0   5-9  


 


Urine Specific Gravity   1.025 H  1.016-1.022  


 


Urine Protein   2+ H  NEGATIVE  


 


Urine Glucose (UA)   2+ H  NEGATIVE  


 


Urine Ketones   NEGATIVE   NEGATIVE  


 


Urine Nitrite   NEGATIVE   NEGATIVE  


 


Urine Bilirubin   NEGATIVE   NEGATIVE  


 


Urine Urobilinogen   2.0   < = 1.0  MG/DL


 


Urine Leukocyte Esterase   NEGATIVE   NEGATIVE  


 


Urine RBC (Auto)   2+ H  NEGATIVE  


 


Urine RBC   0-2    /HPF


 


Urine WBC   10-25 H   /HPF


 


Urine Squamous Epithelial


Cells 


 


 NONE 


 


  /HPF





 


Urine Crystals   NONE    /LPF


 


Urine Bacteria   FEW H   /HPF


 


Urine Casts   NONE    /LPF


 


Urine Mucus   NEGATIVE    /LPF


 


Urine Culture Indicated   YES    


 


Phosphorus Level    3.2  2.3-4.7  MG/DL


 


Magnesium Level    1.5 L 1.6-2.4  MG/DL


 


Triglycerides Level    82  <150  MG/DL


 


Cholesterol Level    87  < 200  MG/DL


 


LDL Cholesterol Direct    32  1-129  MG/DL


 


VLDL Cholesterol    16  5-40  MG/DL


 


HDL Cholesterol    36 L 40-60  MG/DL











Physical Exam


Physical Exam


Vital Signs





Vital Signs - First Documented








 1/10/22 1/10/22 1/10/22 1/11/22





 18:26 23:26 23:48 00:30


 


Temp 37.0   


 


Pulse 111   


 


Resp 16   


 


B/P (MAP) 190/102 (131)   


 


Pulse Ox 94   


 


O2 Delivery  Room Air  


 


O2 Flow Rate   2.00 


 


FiO2    21





Capillary Refill : Less Than 3 Seconds


Height, Weight, BMI


Height: 5'8.00"


Weight: 241lbs. 0.0oz. [id number]; 38.69 BMI


Method:Stated


General Appearance:  No Apparent Distress


Eyes:  Bilateral Eye Normal Inspection, Bilateral Eye PERRL, Bilateral Eye EOMI


HEENT:  PERRL/EOMI, TMs Normal, Normal ENT Inspection, Pharynx Normal, Moist 

Mucous Membranes


Neck:  Full Range of Motion, Normal Inspection, Non Tender, Supple, Carotid 

Bruit


Respiratory:  Chest Non Tender, Normal Breath Sounds, No Accessory Muscle Use, 

No Respiratory Distress


Cardiovascular:  Regular Rate, Rhythm, No Edema, No Gallop, No JVD, No Murmur, 

Normal Peripheral Pulses


Gastrointestinal:  Normal Bowel Sounds, No Organomegaly, No Pulsatile Mass, Non 

Tender, Soft


Back:  Normal Inspection, No CVA Tenderness, No Vertebral Tenderness


Extremity:  Normal Capillary Refill, Normal Inspection, Normal Range of Motion, 

Non Tender, No Calf Tenderness, No Pedal Edema


Neurologic/Psychiatric:  Alert, No Motor/Sensory Deficits, Normal Mood/Affect


Skin:  Normal Color, Warm/Dry


Lymphatic:  No Adenopathy





A/P-Cardiology


Admission Diagnosis


Right lower lobe pneumonia


Change in mental status


Type II myocardial infarction


Hypertension





Assessment/Plan


Right lower lobe infiltrate, started on azithromycin and piperacillin.  Managed 

by primary care team





Fever with shortness of breath secondary to pneumonia.  Afebrile at this time





Change in mental status, confusion, delirium, probably secondary to above





Mild elevation in troponin level.  History of nonischemic cardiomyopathy.  Last 

stress test was done by Dr. Hinds showing no significant ischemia or infarction

in March 2021.





Chronic dyspnea, history of obesity hypoventilation syndrome.  Moderate 

obstructive and restrictive lung defect on PFT in 2017.





History of nonischemic cardiomyopathy with congestive heart failure, resolved.  

Last work-up included an echo done by Dr. Hinds in March 2021 reported ejection

fraction 45 to 50% with grade 1 diastolic dysfunction, moderate left atrial 

dilatation, PA pressure 35 to 40 mmHg.  Last stress test was done in March 2021 

showing no evidence of ischemia or infarction, with ejection fraction 52%





Mild elevation in troponin level could be secondary to hypoxemia and COPD 

continue to monitor trend





History of CVA in the remote past with transient slurred speech.  Resolved





Mild bilateral carotid stenosis, ultrasound was done in March 2021





Hypertension, continue to monitor blood pressure





Hyperlipidemia, monitor lipids





Diabetes mellitus, poorly controlled, managed by primary care physician





History of bilateral lower extremities edema secondary to chronic venous 

insufficiency





History of chronic kidney disease stage III.  Followed by nephrologist





History of baseline incomplete left bundle branch block.





History of obstructive sleep apnea, diagnosed in 2017, has been refusing 

treatment





Hypothyroidism, followed and managed by primary care physician





Gastroesophageal reflux disease





Depression











JAVY FERRER MD              Jan 11, 2022 07:52

## 2022-01-11 NOTE — WOUND CARE ASSESSMENT
Wound Care Assessment


Date Seen by Provider:  Jan 11, 2022


Time Seen by Provider:  17:45


Chief Complaint


Severe edema


LUCIANA Spear is a pleasant gentleman that is quite hard of hearing admitted to the 

hospital for which I was consulted in regards to his pedal edema. He has massive

bilateral pedal edema from his feet to mid thigh. He states that this is 

chronic. Surprisingly he has no weeping wounds associated. He does have some 

induration and brawny changes to the RLE and some chronic inflammation and 

erythema bilaterally. On reviewing his cardiac status, this does not appear to 

be CHF related. He does have chronic venous stasis (though patient does give a 

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

does call into question filariasis as original cause as well). Regardless of the

cause, he is at risk for skin breakdown as a result. He is a poor historian and 

unlikely to be compliant with rigid elevation protocol (2 hours at a time 3 

times daily and at HS would be advisable). Because Mr. Crews has no open 

wound, he is not a candidate for compression wraps (nor do we have these 

available in the inpatient setting). He is currently in ACE wraps from the ankle

to mid-calf that are very loosely applied. I would advise using 2 wraps per leg 

and wrapping from toes to knee. He may require a smaller wrap for the feet and 

larger for calves if difficult to acheive any compression with the larger wrap 

on the feet. We do not know Mr. Crews's arterial status but ACE wraps should 

not provide enough compression to cause arterial compromise regardless. I would 

also advise SCD's to assist in this matter as well.


Past Medical History:  Admits Diabetes Type II, Admits Heart Disease


CHF, Venous hypertension, stage 3 chronic kidney disease


Smoking Status:  Former Smoker


Recreational Drug Use:  No


Alcohol Use:  Denies Use


Review of Systems


General:  Other (Obesity)


Neurological:  Weakness





Exam





Vital Signs








  Date Time  Temp Pulse Resp B/P (MAP) Pulse Ox O2 Delivery O2 Flow Rate FiO2


 


1/11/22 15:40 36.7 82 22 153/70 (97) 99 Nasal Cannula 2.00 


 


1/11/22 00:30        21





Capillary Refill : Less Than 3 Seconds


General Appearance:  WD/WN, no apparent distress, obese


Respiratory:  no respiratory distress, no accessory muscle use


Extremities:  non-tender, inflammation, pedal edema (3+)


Neurologic/Psychiatric:  alert, normal mood/affect


Skin:  warm/dry


Skin Problem Location:  lower extremities


Skin Character:  erythema, swelling, thickening





Results


Laboratory Tests


1/10/22 18:37: 


White Blood Count 11.5H, Red Blood Count 4.59, Hemoglobin 13.6, Hematocrit 40, 

Mean Corpuscular Volume 87, Mean Corpuscular Hemoglobin 30, Mean Corpuscular 

Hemoglobin Concent 34, Red Cell Distribution Width 12.2, Platelet Count 182, 

Mean Platelet Volume 10.1, Immature Granulocyte % (Auto) 1, Neutrophils (%) 

(Auto) 71, Lymphocytes (%) (Auto) 17, Monocytes (%) (Auto) 11, Eosinophils (%) 

(Auto) 0, Basophils (%) (Auto) 0, Neutrophils # (Auto) 8.1H, Lymphocytes # 

(Auto) 2.0, Monocytes # (Auto) 1.2H, Eosinophils # (Auto) 0.0, Basophils # 

(Auto) 0.0, Immature Granulocyte # (Auto) 0.1, Prothrombin Time 15.2H, INR 

Comment 1.2, Activated Partial Thromboplast Time 41H, D-Dimer 1.76H, Sodium 

Level 128L, Potassium Level 4.3, Chloride Level 93L, Carbon Dioxide Level 25, 

Anion Gap 10, Blood Urea Nitrogen 20H, Creatinine 1.43H, Estimat Glomerular 

Filtration Rate 47, BUN/Creatinine Ratio 14, Glucose Level 266H, Calcium Level 

9.3, Corrected Calcium 9.5, Total Bilirubin 1.6H, Aspartate Amino Transf 

(AST/SGOT) 18, Alanine Aminotransferase (ALT/SGPT) 12, Alkaline Phosphatase 73, 

Troponin I 0.074H, C-Reactive Protein High Sensitivity 31.22H, B-Type 

Natriuretic Peptide 178.0H, Total Protein 8.1, Albumin 3.8, Procalcitonin 0.10H,

Thyroid Stimulating Hormone (TSH) 1.32, Free Thyroxine 1.31, Influenza Type A 

(RT-PCR) Not Detected, Influenza Type B (RT-PCR) Not Detected, SARS-CoV-2 RNA 

(RT-PCR) Not Detected


1/10/22 18:40: 


Glucometer 265H, Lactic Acid Level 1.70


1/10/22 18:47: 


Urine Color YELLOW, Urine Clarity CLEAR, Urine pH 6.0, Urine Specific Gravity 

1.025H, Urine Protein 2+H, Urine Glucose (UA) 2+H, Urine Ketones NEGATIVE, Urine

Nitrite NEGATIVE, Urine Bilirubin NEGATIVE, Urine Urobilinogen 2.0, Urine Leukoc

yte Esterase NEGATIVE, Urine RBC (Auto) 2+H, Urine RBC 0-2, Urine WBC 10-25H, 

Urine Squamous Epithelial Cells NONE, Urine Crystals NONE, Urine Bacteria FEWH, 

Urine Casts NONE, Urine Mucus NEGATIVE, Urine Culture Indicated YES


1/11/22 04:15: 


White Blood Count 10.5, Red Blood Count 4.02L, Hemoglobin 12.1L, Hematocrit 35L,

Mean Corpuscular Volume 87, Mean Corpuscular Hemoglobin 30, Mean Corpuscular 

Hemoglobin Concent 35, Red Cell Distribution Width 12.2, Platelet Count 161, 

Mean Platelet Volume 10.8, Immature Granulocyte % (Auto) 1, Neutrophils (%) 

(Auto) 60, Lymphocytes (%) (Auto) 27, Monocytes (%) (Auto) 12, Eosinophils (%) 

(Auto) 0, Basophils (%) (Auto) 0, Neutrophils # (Auto) 6.3, Lymphocytes # (Auto)

2.8, Monocytes # (Auto) 1.3H, Eosinophils # (Auto) 0.0, Basophils # (Auto) 0.0, 

Immature Granulocyte # (Auto) 0.1, Sodium Level 131L, Potassium Level 4.2, 

Chloride Level 94L, Carbon Dioxide Level 28, Anion Gap 9, Blood Urea Nitrogen 

22H, Creatinine 1.45H, Estimat Glomerular Filtration Rate 46, BUN/Creatinine 

Ratio 15, Glucose Level 303H, Calcium Level 8.5, Corrected Calcium 9.2, Total 

Bilirubin 1.2H, Aspartate Amino Transf (AST/SGOT) 13, Alanine Aminotransferase 

(ALT/SGPT) 9, Alkaline Phosphatase 61, Troponin I 0.089H, C-Reactive Protein 

High Sensitivity 29.34H, Total Protein 6.6, Albumin 3.1L, Procalcitonin 0.15H, 

Phosphorus Level 3.2, Magnesium Level 1.5L, Triglycerides Level 82, Cholesterol 

Level 87, LDL Cholesterol Direct 32, VLDL Cholesterol 16, HDL Cholesterol 36L


1/11/22 11:16: Glucometer 288H


1/11/22 15:53: Glucometer 370H





Microbiology


1/10/22 Blood Culture - Preliminary, Resulted


          No growth


1/10/22 Urine Culture - Preliminary, Resulted


          Staphylococcus epidermidis





Microbiology


1/10/22 Blood Culture - Preliminary, Resulted


          No growth


1/10/22 Urine Culture - Preliminary, Resulted


          Staphylococcus epidermidis


1/10/22 Blood Culture - Preliminary, Resulted


          Gram Positive Cocci


          See Comments





Assessment/Plan/Dx


Assessment:


1. Severe lyphedema (venous stasis likely)





Plan: 


1. Elevation above the level of the heart as much as possible and tolerated


2. ACE wraps to bilateral lower extremity. See above instructions for placement


3. SCD's











MALICK ORELLANA MD            Jan 11, 2022 17:52

## 2022-01-11 NOTE — HISTORY & PHYSICAL-HOSPITALIST
History of Present Illness


HPI/Chief Complaint


Chief Complaint: Altered mental status





HPI: This is an 86yoWM with a past medical history of chronic lower extremity ed

ema and just overall declined status and chronic debility who presented to the 

ER due to UTI with altered mental status and hypoxia with elevated Troponin. 

Cardiology has been consulted. We will transfer him down to 4th floor and 

maintain Telemetry and we will have social work review what options are for Home

Health Care. He reports he walks around with a walker and a wheelchair. He 

appears to be chronically ill.


Source:  patient, family


Exam Limitations:  clinical condition


Date Seen


1/11/22


Time Seen by a Provider:  10:00


Attending Physician


Joanna Rogel DO


PCP


No,Local Physician


Referring Physician





Date of Admission


Curt 10, 2022 at 22:54





Home Medications & Allergies


Home Medications


Reviewed patient Home Medication Reconciliation performed by pharmacy medication

reconciliations technician and/or nursing.


Patients Allergies have been reviewed.





Allergies





Allergies


Coded Allergies


  No Known Drug Allergies ([id number]/14/10)








Past Medical-Social-Family Hx


Patient Social History


Marrital Status:  


Employed/Student:  retired


Tobacco Use?:  No


Tobacco type used:  Cigarettes


Smoking Status:  Former Smoker


Smokeless Tobacco Frequency:  Never a User


Use of E-Cig and/or Vaping dev:  No


Substance use?:  No


Alcohol Use?:  No


Pt feels they are or have been:  No





Immunizations Up To Date


Date of Influenza Vaccine:  Oct 27, 2020


First/Initial COVID19 Vaccinat:  12/21


Second COVID19 Vaccination Isaias:  12/21


Date of Pneumonia Vaccine:  Jan 1, 2010





Current Status


Advance Directives:  No


Communicates:  Verbally


Primary Language:  English


Preferred Spoken Language:  English


Is interpretation needed?:  No


Sensory deficits:  Vision impairment


Implanted or Applied Medical D:  None





Past Medical History


Surgeries:  Abdominal, Gallbladder, Orthopedic


Chronic Bronchitis


Chronic Edema/Swelling, Coronary Artery Disease, High Cholesterol, Hypertension


TIA


Renal Failure


Diabetes, Insulin dep





Review of Systems


Constitutional:  see HPI, malaise, weakness


EENTM:  no symptoms reported


Respiratory:  dyspnea on exertion


Cardiovascular:  edema


Gastrointestinal:  no symptoms reported


Genitourinary:  no symptoms reported


Musculoskeletal:  no symptoms reported


Skin:  no symptoms reported


Psychiatric/Neurological:  Weakness


All Other Systems Reviewed


Negative Unless Noted:  Yes





Physical Exam


Physical Exam


Vital Signs





Vital Signs - First Documented








 1/10/22 1/10/22 1/10/22 1/11/22





 18:26 23:26 23:48 00:30


 


Temp 37.0   


 


Pulse 111   


 


Resp 16   


 


B/P (MAP) 190/102 (131)   


 


Pulse Ox 94   


 


O2 Delivery  Room Air  


 


O2 Flow Rate   2.00 


 


FiO2    21





Capillary Refill : Less Than 3 Seconds


Height, Weight, BMI


Height: 5'8.00"


Weight: 241lbs. 0.0oz. [id number]; 38.69 BMI


Method:Stated


General Appearance:  No Apparent Distress, Chronically ill, Obese


Eyes:  Right Eye Normal Inspection, Right Eye PERRL


HEENT:  PERRL/EOMI, Normal ENT Inspection, Pharynx Normal, Moist Mucous 

Membranes


Neck:  Full Range of Motion, Normal Inspection, Non Tender


Respiratory:  Chest Non Tender, Lungs Clear, No Accessory Muscle Use, No 

Respiratory Distress, Decreased Breath Sounds


Cardiovascular:  Regular Rate, Rhythm, No Edema, No Gallop, No JVD, No Murmur, 

Normal Peripheral Pulses


Gastrointestinal:  Normal Bowel Sounds, No Organomegaly, No Pulsatile Mass, Non 

Tender, Soft


Back:  Normal Inspection, No CVA Tenderness, No Vertebral Tenderness


Extremity:  Normal Capillary Refill, Normal Inspection, Normal Range of Motion, 

Non Tender, No Calf Tenderness, No Pedal Edema


Neurologic/Psychiatric:  Alert, Oriented x3, No Motor/Sensory Deficits, CNs II-

XII Norm as Tested, Depressed Affect, Motor Weakness (lower extremities)


Skin:  Normal Color, Warm/Dry


Lymphatic:  No Adenopathy





Results


Results/Procedures


Labs


Laboratory Tests


1/10/22 18:37








1/11/22 04:15








Patient resulted labs reviewed.





Assessment/Plan


Admission Diagnosis


Assessment:


Left lower lobe PNA


Sepsis


Fall


UTI


AMS


DM


TIA


Chronic lower extremity edema


Chronic debility


HTN


CKD


Elevated troponin





Plan:


IV abx


Monitor closely


Cardiology consult


Home meds


PT OT


Admission Status:  Inpatient Order (span 2 midnights)


Reason for Inpatient Admission:  


PNA





Diagnosis/Problems


Diagnosis/Problems





(1) Pneumonia


Status:  Acute


Qualifiers:  


   Pneumonia type:  due to unspecified organism  Laterality:  left  Lung 

location:  lower lobe of lung  Qualified Codes:  J18.9 - Pneumonia, unspecified 

organism


(2) Fall on same level


Status:  Acute


Qualifiers:  


   Encounter type:  initial encounter  Qualified Codes:  W18.30XA - Fall on same

level, unspecified, initial encounter


(3) Elevated troponin


Status:  Acute


(4) Altered mental status


Status:  Acute


Qualifiers:  


   Altered mental status type:  unspecified  Qualified Codes:  R41.82 - Altered 

mental status, unspecified


(5) Anasarca


Status:  Acute


(6) Urinary tract infection


Status:  Acute


Qualifiers:  


   Urinary tract infection type:  site unspecified  Hematuria presence:  without

hematuria  Qualified Codes:  N39.0 - Urinary tract infection, site not specified











JOANNA ROGEL DO                Jan 11, 2022 10:02

## 2022-01-11 NOTE — TELE-ICU PROGRESS NOTE
Subjective


Date Seen by a Provider:  Jan 11, 2022


Time Seen by a Provider:  00:05


Subjective/Events-last exam


This virtual visit was conducted using real time audio/video. 


Thank you for asking us to see this patient for respiratory insufficiency due to

minor L basal infilt, possible COPD as pt is on home O2. Presented w AMS, now 

improved. CTH neg. 


ROS: as in HPI 


PE: VSS. O2 sat on 


HEENT: No obvious masses, adenopathy or JVD. 


Chest: clear to auscultation. 


CV: RRR S1 S2 No murmur or added sounds. 


Abd: Non-tender. Bowel sounds Y. 


: Unremarkable. [place] N. 


CNS/psychiatric: Grossly intact. No obvious focal findings. 


Extremities: 3 + edema. Capillary refill < 3 seconds. 


Skin: unremarkable. 


Results: Elevated WCC 11.5, . CXR: v minor L basal infilt/atel.. 


Available chart/ vitals / labs / images reviewed.


Video assessment done using teleICU camera, rest of exam as per RN. 


A/P: Respiratory insufficiency: Continue present management with O2. ?Needs ICU


Monitor for increasing oxygenation needs and/or need for intubation. 


Critical Care: critically ill patient. Cont. Zosyn, lovenox.


Discussed with RN . Asked RN to reach out to eICU if any questions or concerns 

later. Time spent with patient/coordination of care with other health 

professionals (mins): 27.


?





Sepsis Event


Evaluation


Height, Weight, BMI


Height: 5'8.00"


Weight: 241lbs. 0.0oz. 109.761948vn; 38.69 BMI


Method:Stated





Focused Exam


Lactate Level


1/10/22 18:40: Lactic Acid Level 1.70





Exam


Exam


Patient acknowledged, consented, and participated in this virtual visit which 

was conducted using real time audio/video


Vital Signs








  Date Time  Temp Pulse Resp B/P (MAP) Pulse Ox O2 Delivery O2 Flow Rate FiO2


 


1/10/22 23:26  98 22 147/88 94 Room Air  


 


1/10/22 18:26 37.0 111 16 190/102 (131) 94   








Height & Weight


Height: 5'8.00"


Weight: 241lbs. 0.0oz. 109.620625ff; 38.69 BMI


Method:Stated


General Appearance:  No Apparent Distress


Capillary Refill:  Less Than 3 Seconds


Peripheral Pulses:  1+ Dorsalis Pedis (R), 1+ Left Dors-Pedis (L)


Gastrointestinal:  normal bowel sounds, soft, distended, tenderness (Slight 

generalized tenderness, no acute abdomen)





Results


Lab


Laboratory Tests


1/10/22 18:37











Assessment/Plan


Assessment/Plan


See free text.


Critical Care:  Critically Ill Patient (see free text)











EMY CALZADA MD          Jan 11, 2022 00:33

## 2022-01-11 NOTE — PHYSICAL THERAPY EVALUATION
PT Evaluation-General


Medical Diagnosis


Admission Date


Curt 10, 2022 at 22:54


Medical Diagnosis:  SOB, weakness, fall


Onset Date:  Curt 10, 2022





Therapy Diagnosis


Therapy Diagnosis:  impaired mobility, strength, endurance





Height/Weight


Height (Feet):  5


Height (Inches):  8.00


Weight (Pounds):  241


Weight (Ounces):  0.0





Precautions


Precautions/Isolations:  Fall Prevention, Standard Precautions





Referral


Physician:  Joanna Rogel DO


Reason for Referral:  Evaluation/Treatment





Medical History


Additional Medical History


Past Medical History


Surgeries:  Abdominal, Gallbladder, Orthopedic


Chronic Bronchitis


Chronic Edema/Swelling, Coronary Artery Disease, High Cholesterol, Hypertension


TIA


Renal Failure


Diabetes, Insulin dep


Reviewed History:  Yes





Social History


Home:  Single Level


Current Living Status:  Spouse


Entry Into Home:  Ramp





Prior


Prior Level of Function


SCALE: Activities may be completed with or without assistive devices.





6-Indepedent-patient completes the activity by him/herself with no assistance 

from a helper.


5-Set-up or Clean-up Assistance-helper sets up or cleans up; patient completes 

activity. [place] assists only prior to or  


    following the activity.


4-Supervision or Touching Assistance-helper provides verbal cues and/or 

touching/steadying and/or contact guard assistance as patient completes 

activity. Assistance may be provided   


    throughout the activity or intermittently.


3-Partial/Moderate Assistance-helper does LESS THAN HALF the effort. [place] 

lifts, holds or supports trunk or limbs, but provides less than half the effort.


2-Substantial/Maximal Assistance-helper does MORE THAN HALF the effort. [place] 

lifts or holds trunk or limbs and provides more than half the effort.


9-Zsqnixydr-yabwvj does ALL the effort. Patient does none of the effort to 

complete the activity. Or, the assistance of 2 or more helpers is required for 

the patient to complete the  


    activity.


If activity was not attempted, code reason:


7-Patient Refused.


9-Not Applicable-not attempted and the patient did not perform the activity 

before the current illness, exacerbation or injury.


10-Not Attempted due to Environmental Limitations-(lack of equipment, weather 

restraints, etc.).


88-Not Attempted due to Medical Conditions or Safety Concerns.


Bed Mobility:  6


Transfers (B,C,W/C):  6


Gait:  4


family states he uses either a cane or walker depending how he feels, he 

occasionally uses a wheelchair





PT Evaluation-Current


Subjective


Patient in bed pre tx, agrees to PT, voices no complaints of pain.





Pt/Family Goals


none stated





Objective


Patient Orientation:  Person, Confused, Mumbles





ROM/Strength


ROM Lower Extremities


limited due to edema


Strength Lower Extremities


grossly 4/5 BLE





Integumentary/Posture


Integumentary


very swollen BLE





Transfers


Roll Left to Right (QC):  3


Sit to Lying (QC):  1


Lying to Sitting/Side of Bed(Q:  3


Sit to Stand (QC):  3





Gait


Does the Patient Walk?:  Yes


Mode of Locomotion:  Walk


Anticipated Mode of Locomotion:  Walk


Walk 10 feet (QC):  4


Distance:  40'


Gait Assistive Device:  FWW


Comments/Gait Description


slow ambulation, needs cues for direction, confused, impulsive





Balance


Sitting Static:  Fair


Sitting Dynamic:  Fair


Standing Static:  Poor


 Standing Dynamic:  Poor





Assessment/Needs


Patient in bed post tx with nurse call, phone, tray, all needs met.  O2 dropped 

into the high 80's with ambulation.  Patient has impaired mobility, strength, 

endurance.  He is confused and impulsive.  Left patient with OT post tx.


Rehab Potential:  Fair





PT Long Term Goals


Long Term Goals


PT Long Term Goals Time Frame:  Jan 18, 2022


Roll Left & Right (QC):  6


Sit to Lying (QC):  6


Lying-Sitting on Side/Bed(QC):  6


Sit to Stand (QC):  4


Chair/Bed-to-Chair Xfer(QC):  4


Walk 10 feet (QC):  4


Walk 50ft with 2 Turns (QC):  4





PT Plan


Problem List


Problem List:  Activity Tolerance, Functional Strength, Safety, Balance, Gait, 

Transfer, Bed Mobility, ROM





Treatment/Plan


Treatment Plan:  Continue Plan of Care


Treatment Plan:  Bed Mobility, Education, Functional Activity [place], Functional 

Strength, Gait, Safety, Therapeutic Exercise, Transfers


Treatment Duration:  Jan 18, 2022


Frequency:  6 times per week


Estimated Hrs Per Day:  .25 hour per day


Patient and/or Family Agrees t:  Yes





Safety Risks/Education


Patient Education:  Gait Training, Transfer Techniques, Correct Positioning, 

Safety Issues


Teaching Recipient:  Patient


Teaching Methods:  Demonstration, Discussion


Response to Teaching:  Reinforcement Needed





Discharge Recommendations


Plan


Patient will perform bed mobility and transfer training, balance and endurance 

training, functional strengthening, gait training, and education, to improve 

functional mobility and independence at home.


Therapy Discharge Recommendati:  24 Hour Supervision





Time/GCodes


Time In:  1018


Time Out:  1030


Total Billed Treatment Time:  12


Total Billed Treatment


1 visit


EVELINA SOLIMAN PT                Jan 11, 2022 11:07

## 2022-01-11 NOTE — OCCUPATIONAL THERAPY EVAL
OT Evaluation-General/PLF


Medical Diagnosis


Admission Date


Curt 10, 2022 at 22:54


Medical Diagnosis:  SOB, weakness, fall


Onset Date:  Curt 10, 2022





Therapy Diagnosis


Therapy Diagnosis:  reduced adl status





Height/Weight


Height (Feet):  5


Height (Inches):  8.00


Weight (Pounds):  241


Weight (Ounces):  0.0





Precautions


Precautions/Isolations:  Fall Prevention, Standard Precautions





Referral


Physician:  Joanna Rogel DO


Referral Reason:  Evaluation/Treatment





Medical History


Pertinent Medical History:  DM, Heart Failure


Additional Medical History


Chronic LB edema


Current History


Pt presents to ER with confusion, fever, cough, and SOB. Wife reports pt fell 

but is unsure if he hit his head. Blood sugars found to be 265 at time of 

admission.


Reviewed History:  Yes





Social History


Home:  Single Level


Current Living Status:  Spouse


Entry Into Home:  Ramp


Pt lives with wife in a single story home. Wife assists with all ADLs. 

Wife/family report that they would like to reduce burden of care on wife and 

have pt assist as much as possible. Pt owns a cane, walker, and w/c. Pt utilized

one of these devices depending on how he felt that day.





ADL-Prior Level of Function


SCALE: Activities may be completed with or without assistive devices.





6-Indepedent-patient completes the activity by him/herself with no assistance 

from a helper.


5-Set-up or Clean-up Assistance-helper sets up or cleans up; patient completes 

activity. [place] assists only prior to or  


    following the activity.


4-Supervision or Touching Assistance-helper provides verbal cues and/or 

touching/steadying and/or contact guard assistance as patient completes 

activity. Assistance may be provided   


    throughout the activity or intermittently.


3-Partial/Moderate Assistance-helper does LESS THAN HALF the effort. [place] 

lifts, holds or supports trunk or limbs, but provides less than half the effort.


2-Substantial/Maximal Assistance-helper does MORE THAN HALF the effort. [place] 

lifts or holds trunk or limbs and provides more than half the effort.


2-Zhqmtykyr-shrgad does ALL the effort. Patient does none of the effort to 

complete the activity. Or, the assistance of 2 or more helpers is required for 

the patient to complete the  


    activity.


If activity was not attempted, code reason:


7-Patient Refused.


9-Not Applicable-not attempted and the patient did not perform the activity 

before the current illness, exacerbation or injury.


10-Not Attempted due to Environmental Limitations-(lack of equipment, weather 

restraints, etc.).


88-Not Attempted due to Medical Conditions or Safety Concerns.


Self Care:  Dependent


Functional Cognition:  Needed Some Help


DME/Equipment:  Bath Chair, Grab Bars, Shower


Drive Self:  No





OT Current Status


Subjective


Pt denies pain. Confused, mumbling, difficult to understand.





Appearance


Returned to supine, all needs within reach, family in room.





Mental Status/Objective


Patient Orientation:  Person, Confused


Attachments:  [place] Catheter, IV, Oxygen, Telemetry





Current


Upper Extremity ROM


Bilateral shoulder ~145 degrees


Elbow-distally WNL


Upper Extremity Strength


3+/5 grossly





ADL-Treatment


On/Off Footwear (QC):  1


Toileting Hygiene (QC):  1


Supine>Sit: mod a to bring LE's off side of bed. Fair sitting balance on edge. 

Dependent to don bilateral socks (baseline). Pt with significant edema in BLE's,

thus causing difficulty to flex knees when going from sit>stand. Min-Mod a to 

stand with extra time for pt to bring LE's underneath him (due to them being 

extended). He ambulated within room with Min a and use of walker. Poor walker sa

fety. Desat to 86% after limited activity. Recovers quickly with rest and cues 

for PLB. Dependent to return to supine. Lengthy conversation with family and 

energy conservation, adaptive equipment, and compensatory strategies. Family 

interested in learning techniques to ease burden of care on wife.





Education


OT Patient Education:  Correct positioning, Energy conservation, Instructions to

caregiver, Modified ADL techniques, Progress toward Goal/Update tx plan, Purpose

of tx/functional activities, Safety issues, Transfer techniques, Use of adapted 

equipment


Teaching Recipient:  Patient, Family


Teaching Methods:  Demonstration, Discussion


Response to Teaching:  Verbalize Understanding, Return Demonstration, 

Reinforcement Needed





OT Long Term Goals


Long Term Goals


Time Frame:  Jan 28, 2022


Oral Hygiene (QC):  4


Toileting Hygiene (QC):  3


Shower/Bathe Self (QC):  2


Upper Body Dressing (QC):  3


Lower Body Dressing (QC):  3


1=Demonstrate adherence to instructed precautions during ADL tasks.


2=Patient will verbalize/demonstrate understanding of assistive 

devices/modifications for ADL.


3=Patient will improve strength/tolerance for activity to enable patient to 

perform ADL's.





OT Education/Plan


Problem List/Assessment


Assessment:  Decreased Activ Tolerance, Decreased Safety Aware, Decreased UE 

Strength, Dependent Transfers, Edema, Impaired Bed Mobility, Impaired Cognition,

Impaired Funct Balance, Impaired Self-Care Skills, Restricted Funct UE ROM





Discharge Recommendations


Plan/Recommendations:  Continue POC


Therapy Discharge Recommendati:  Scheduled Assistance, Bath Aide, Homemaker 

Support, Home & Family





Treatment Plan/Plan of Care


Treatment,Training & Education:  Yes


Patient would benefit from OT for education, treatment and training to promote 

independence in ADL's, mobility, safety and/or upper extremity function for 

ADL's.


Plan of Care:  ADL Retraining, Caregiver Training, Functional Mobility, UE Funct

Exercise/Act


Treatment Duration:  Jan 25, 2022


Frequency:  3 times per week


Estimated Hrs Per Day:  .25 hour per day


Rehab Potential:  Poor





Time/GCodes


Start Time:  10:19


Stop Time:  10:45


Total Time Billed (hr/min):  26


Billed Treatment Time


1 visit


EVM (10 min)


ADL (16 min)











Salma Poe OT                Jan 11, 2022 13:20

## 2022-01-12 VITALS — SYSTOLIC BLOOD PRESSURE: 140 MMHG | DIASTOLIC BLOOD PRESSURE: 64 MMHG

## 2022-01-12 VITALS — SYSTOLIC BLOOD PRESSURE: 140 MMHG | DIASTOLIC BLOOD PRESSURE: 71 MMHG

## 2022-01-12 VITALS — DIASTOLIC BLOOD PRESSURE: 64 MMHG | SYSTOLIC BLOOD PRESSURE: 140 MMHG

## 2022-01-12 VITALS — DIASTOLIC BLOOD PRESSURE: 75 MMHG | SYSTOLIC BLOOD PRESSURE: 161 MMHG

## 2022-01-12 VITALS — DIASTOLIC BLOOD PRESSURE: 69 MMHG | SYSTOLIC BLOOD PRESSURE: 140 MMHG

## 2022-01-12 VITALS — SYSTOLIC BLOOD PRESSURE: 131 MMHG | DIASTOLIC BLOOD PRESSURE: 68 MMHG

## 2022-01-12 VITALS — DIASTOLIC BLOOD PRESSURE: 82 MMHG | SYSTOLIC BLOOD PRESSURE: 171 MMHG

## 2022-01-12 LAB
ALBUMIN SERPL-MCNC: 3 GM/DL (ref 3.2–4.5)
ALP SERPL-CCNC: 62 U/L (ref 40–136)
ALT SERPL-CCNC: 10 U/L (ref 0–55)
BASOPHILS # BLD AUTO: 0 10^3/UL (ref 0–0.1)
BASOPHILS NFR BLD AUTO: 0 % (ref 0–10)
BILIRUB SERPL-MCNC: 0.7 MG/DL (ref 0.1–1)
BUN/CREAT SERPL: 15
CALCIUM SERPL-MCNC: 8.5 MG/DL (ref 8.5–10.1)
CHLORIDE SERPL-SCNC: 96 MMOL/L (ref 98–107)
CO2 SERPL-SCNC: 26 MMOL/L (ref 21–32)
CREAT SERPL-MCNC: 1.31 MG/DL (ref 0.6–1.3)
EOSINOPHIL # BLD AUTO: 0.1 10^3/UL (ref 0–0.3)
EOSINOPHIL NFR BLD AUTO: 1 % (ref 0–10)
GFR SERPLBLD BASED ON 1.73 SQ M-ARVRAT: 52 ML/MIN
GLUCOSE SERPL-MCNC: 230 MG/DL (ref 70–105)
HCT VFR BLD CALC: 35 % (ref 40–54)
HGB BLD-MCNC: 11.7 G/DL (ref 13.3–17.7)
LYMPHOCYTES # BLD AUTO: 1.9 10^3/UL (ref 1–4)
LYMPHOCYTES NFR BLD AUTO: 21 % (ref 12–44)
MAGNESIUM SERPL-MCNC: 1.8 MG/DL (ref 1.6–2.4)
MANUAL DIFFERENTIAL PERFORMED BLD QL: NO
MCH RBC QN AUTO: 30 PG (ref 25–34)
MCHC RBC AUTO-ENTMCNC: 34 G/DL (ref 32–36)
MCV RBC AUTO: 87 FL (ref 80–99)
MONOCYTES # BLD AUTO: 0.9 10^3/UL (ref 0–1)
MONOCYTES NFR BLD AUTO: 10 % (ref 0–12)
NEUTROPHILS # BLD AUTO: 5.8 10^3/UL (ref 1.8–7.8)
NEUTROPHILS NFR BLD AUTO: 66 % (ref 42–75)
PLATELET # BLD: 166 10^3/UL (ref 130–400)
PMV BLD AUTO: 10.8 FL (ref 9–12.2)
POTASSIUM SERPL-SCNC: 3.9 MMOL/L (ref 3.6–5)
PROT SERPL-MCNC: 6.6 GM/DL (ref 6.4–8.2)
SODIUM SERPL-SCNC: 132 MMOL/L (ref 135–145)
WBC # BLD AUTO: 8.9 10^3/UL (ref 4.3–11)

## 2022-01-12 RX ADMIN — INSULIN ASPART SCH UNIT: 100 INJECTION, SOLUTION INTRAVENOUS; SUBCUTANEOUS at 07:50

## 2022-01-12 RX ADMIN — INSULIN ASPART SCH UNIT: 100 INJECTION, SOLUTION INTRAVENOUS; SUBCUTANEOUS at 16:00

## 2022-01-12 RX ADMIN — INSULIN ASPART SCH UNIT: 100 INJECTION, SOLUTION INTRAVENOUS; SUBCUTANEOUS at 13:46

## 2022-01-12 RX ADMIN — INSULIN ASPART SCH UNIT: 100 INJECTION, SOLUTION INTRAVENOUS; SUBCUTANEOUS at 11:00

## 2022-01-12 RX ADMIN — Medication SCH ML: at 04:07

## 2022-01-12 RX ADMIN — MECLIZINE HYDROCHLORIDE SCH MG: 25 TABLET ORAL at 13:46

## 2022-01-12 RX ADMIN — Medication SCH ML: at 13:47

## 2022-01-12 RX ADMIN — PANTOPRAZOLE SODIUM SCH MG: 40 TABLET, DELAYED RELEASE ORAL at 17:58

## 2022-01-12 RX ADMIN — AMLODIPINE BESYLATE SCH MG: 5 TABLET ORAL at 09:23

## 2022-01-12 RX ADMIN — INSULIN ASPART SCH UNIT: 100 INJECTION, SOLUTION INTRAVENOUS; SUBCUTANEOUS at 21:30

## 2022-01-12 RX ADMIN — LEVOTHYROXINE SODIUM SCH MCG: 150 TABLET ORAL at 07:50

## 2022-01-12 RX ADMIN — SODIUM CHLORIDE SCH MLS/HR: 900 INJECTION, SOLUTION INTRAVENOUS at 02:53

## 2022-01-12 RX ADMIN — INSULIN ASPART SCH UNIT: 100 INJECTION, SOLUTION INTRAVENOUS; SUBCUTANEOUS at 06:59

## 2022-01-12 RX ADMIN — SPIRONOLACTONE SCH MG: 25 TABLET ORAL at 09:23

## 2022-01-12 RX ADMIN — ENALAPRIL MALEATE SCH MG: 5 TABLET ORAL at 21:40

## 2022-01-12 RX ADMIN — MECLIZINE HYDROCHLORIDE SCH MG: 25 TABLET ORAL at 21:40

## 2022-01-12 RX ADMIN — Medication SCH ML: at 21:40

## 2022-01-12 RX ADMIN — IPRATROPIUM BROMIDE AND ALBUTEROL SULFATE SCH ML: .5; 3 SOLUTION RESPIRATORY (INHALATION) at 07:43

## 2022-01-12 RX ADMIN — IPRATROPIUM BROMIDE AND ALBUTEROL SULFATE SCH ML: .5; 3 SOLUTION RESPIRATORY (INHALATION) at 17:24

## 2022-01-12 RX ADMIN — TAMSULOSIN HYDROCHLORIDE SCH MG: 0.4 CAPSULE ORAL at 09:24

## 2022-01-12 RX ADMIN — ASPIRIN SCH MG: 81 TABLET ORAL at 09:23

## 2022-01-12 RX ADMIN — MECLIZINE HYDROCHLORIDE SCH MG: 25 TABLET ORAL at 09:24

## 2022-01-12 RX ADMIN — SODIUM CHLORIDE SCH MLS/HR: 900 INJECTION INTRAVENOUS at 11:40

## 2022-01-12 RX ADMIN — Medication SCH MCG: at 09:23

## 2022-01-12 RX ADMIN — SODIUM CHLORIDE SCH MLS/HR: 900 INJECTION INTRAVENOUS at 17:58

## 2022-01-12 RX ADMIN — OMEGA-3 FATTY ACIDS CAP 1000 MG SCH MG: 1000 CAP at 09:23

## 2022-01-12 RX ADMIN — SODIUM CHLORIDE SCH MLS/HR: 900 INJECTION INTRAVENOUS at 04:04

## 2022-01-12 RX ADMIN — SIMVASTATIN SCH MG: 40 TABLET, FILM COATED ORAL at 09:23

## 2022-01-12 RX ADMIN — CLOPIDOGREL BISULFATE SCH MG: 75 TABLET, FILM COATED ORAL at 09:24

## 2022-01-12 RX ADMIN — ENALAPRIL MALEATE SCH MG: 5 TABLET ORAL at 09:24

## 2022-01-12 RX ADMIN — INSULIN ASPART SCH UNIT: 100 INJECTION, SOLUTION INTRAVENOUS; SUBCUTANEOUS at 17:57

## 2022-01-12 RX ADMIN — IPRATROPIUM BROMIDE AND ALBUTEROL SULFATE SCH ML: .5; 3 SOLUTION RESPIRATORY (INHALATION) at 20:23

## 2022-01-12 RX ADMIN — Medication SCH EA: at 07:00

## 2022-01-12 NOTE — PULMONARY CONSULTATION
History of Present Illness


History of Present Illness


Date Seen by Provider:  Jan 12, 2022


Time Seen by Provider:  10:00


Date of Admission


Consult cancelled. Ignore any autosaved data under my name.


Reason for Visit:  Change in mental status





Allergies and Home Medications


Allergies


Coded Allergies:  


     No Known Drug Allergies (Unverified , 10/14/10)





Home Medications


Cholecalciferol (Vitamin D3) 125 Mcg Tablet, 125 MCG PO DAILY, (Reported)


Clopidogrel Bisulfate 75 Mg Tablet, 75 MG PO DAILY, (Reported)


Enalapril Maleate 5 Mg Tablet, 5 MG PO BID, (Reported)


Esomeprazole Magnesium 40 Mg Capsule.dr, 40 MG PO 1800 W/MEAL, (Reported)


Hydrocodone/Acetaminophen 1 Each Tablet, 1 EA PO TID PRN for PAIN-MODERATE (5-

7), (Reported)


Insulin Aspart 300 Units/3 Ml Solution, 24 UNITS SQ 0700 BEFORE MEAL, (Reported)


Insulin Aspart 300 Units/3 Ml Solution, 18 UNITS SQ 1200,1800 BEFORE MEALS, 

(Reported)


Insulin Glargine,Hum.rec.anlog 100 Unit/1 Ml Insuln.pen, 32 UNIT SQ HS, (Report

ed)


Levothyroxine Sodium 150 Mcg Tablet, 150 MCG PO DAILY, (Reported)


Meclizine HCl 25 Mg Tablet, 25 MG PO TID, (Reported)


Omega-3 Fatty Acids/Fish Oil 1 Each Capsule.dr, 2 EACH PO DAILY, (Reported)


Pedi Mv No.79/Ferrous Fumarate 18 Mg Tab.chew, 18 MG PO DAILY, (Reported)


Simvastatin 40 Mg Tablet, 40 MG PO DAILY, (Reported)


Spironolactone 50 Mg Tablet, 50 MG PO DAILY PRN for FLUID RETENTION, (Reported)


Tamsulosin HCl 0.4 Mg Cap, 0.4 MG PO DAILY, (Reported)


Vit A/Vit C/Vit E/Zinc/Copper 1 Each Capsule, 1 EACH PO DAILY, (Reported)





Past Medical/Social/Family Hx


Patient Social History


Marrital Status:  


Employed/Student:  retired


Tobacco Use?:  No


Tobacco type used:  Cigarettes


Smoking Status:  Former Smoker


Smokeless Tobacco Frequency:  Never a User


Use of E-Cig and/or Vaping dev:  No


Substance use?:  No


Alcohol Use?:  No


Pt stated abuse/neglect:  No





Immunizations Up To Date


Influenza Vaccine Up-to-Date:  No; Not Current


First/Initial COVID19 Vaccinat:  12/21


Second COVID19 Vaccination Isaias:  12/21


Date of Pneumonia Vaccine:  Jan 1, 2010





Current Status


Advance Directives:  No


Communicates:  Verbally


Primary Language:  English


Preferred Spoken Language:  English


Is interpretation needed?:  No


Sensory deficits:  Vision impairment


Implanted or Applied Medical D:  None





Review of Systems


Constitutional:  see HPI


EENTM:  see HPI


Respiratory:  see HPI, cough


Gastrointestinal:  see HPI


Genitourinary:  see HPI


Musculoskeletal:  see HPI


Skin:  see HPI (Consult cancelled)





Sepsis Event


Evaluation


Height, Weight, BMI


Height: 5'8.00"


Weight: 241lbs. 0.0oz. 109.436048ij; 38.69 BMI


Method:Stated





Exam


Exam


Patient acknowledged, consented, and participated in this virtual visit which 

was conducted using real time audio/video


Vital Signs








  Date Time  Temp Pulse Resp B/P (MAP) Pulse Ox O2 Delivery O2 Flow Rate FiO2


 


1/12/22 08:00      Nasal Cannula 1.00 


 


1/12/22 07:28 36.8 84 22 161/75 (103) 97 Nasal Cannula 2.00 


 


1/12/22 07:00  79      


 


1/12/22 06:49 36.4 79   96   32


 


1/12/22 04:00 36.4 79 20 140/64 (89) 96 Nasal Cannula 2.00 


 


1/12/22 01:16     92 Nasal Cannula 2.00 


 


1/12/22 01:00  98      


 


1/12/22 00:00 36.8 102 20 171/82 (111) 94 Nasal Cannula 2.00 


 


1/11/22 23:10     94 Nasal Cannula 2.00 


 


1/11/22 22:03    181/86 (117)    


 


1/11/22 22:00     94 Nasal Cannula 2.00 


 


1/11/22 20:34  111      


 


1/11/22 20:20  121      


 


1/11/22 20:11    200/98 (132)    


 


1/11/22 19:46 37.2 85 20  98 Nasal Cannula 2.00 


 


1/11/22 19:00  84      


 


1/11/22 15:40 36.7 82 22 153/70 (97) 99 Nasal Cannula 2.00 


 


1/11/22 14:33     97 Nasal Cannula 2.00 


 


1/11/22 12:28  91      


 


1/11/22 11:46 37.2 84 18 136/66 97 Nasal Cannula 2.00 


 


1/11/22 11:00  89 227 152/85 93 Nasal Cannula 2.00 


 


1/11/22 10:45     96 Nasal Cannula 2.00 














I & O 


 


 1/12/22





 07:00


 


Intake Total 2570 ml


 


Output Total 2175 ml


 


Balance 395 ml








Height & Weight


Height: 5'8.00"


Weight: 241lbs. 0.0oz. 109.263390vr; 38.69 BMI


Method:Stated


General Appearance:  No Apparent Distress, Chronically ill, Obese


HEENT:  PERRL/EOMI, Normal ENT Inspection, Pharynx Normal, Moist Mucous 

Membranes


Neck:  Full Range of Motion, Normal Inspection, Non Tender


Respiratory:  Chest Non Tender, Lungs Clear, No Accessory Muscle Use, No 

Respiratory Distress, Decreased Breath Sounds


Cardiovascular:  Regular Rate, Rhythm, No Edema, No Gallop, No JVD, No Murmur, 

Normal Peripheral Pulses


Capillary Refill:  Less Than 3 Seconds


Peripheral Pulses:  1+ Dorsalis Pedis (R), 1+ Left Dors-Pedis (L)


Gastrointestinal:  normal bowel sounds, soft, distended, tenderness (Slight 

generalized tenderness, no acute abdomen)


Extremity:  Normal Capillary Refill, Normal Inspection, Normal Range of Motion, 

Non Tender, No Calf Tenderness, No Pedal Edema


Neurologic/Psychiatric:  Alert, Oriented x3, No Motor/Sensory Deficits, CNs II-

XII Norm as Tested, Depressed Affect, Motor Weakness (lower extremities)


Skin:  Normal Color, Warm/Dry


Lymphatic:  No Adenopathy





Results


Lab


Laboratory Tests


1/10/22 18:37








1/11/22 04:15








1/12/22 04:55











Assessment/Plan


Assessment/Plan


Consult cancelled. Ignore autosaved data under my name.


Critical Care:  Critically Ill Patient











EMY CALZADA MD          Jan 12, 2022 10:52

## 2022-01-12 NOTE — PROGRESS NOTE - CARDIOLOGY
Cardiology SOAP Progress Note


Subjective:


No cp or palp or syncope


Shortness of breath with activity


Gen malaise and weakness


No n/v





Objective:


I&O/Vital Signs











 1/12/22 1/12/22 1/12/22 1/12/22





 06:49 07:00 07:28 08:00


 


Temp 36.4  36.8 


 


Pulse 79 79 84 


 


Resp   22 


 


B/P (MAP)   161/75 (103) 


 


Pulse Ox 96  97 


 


O2 Delivery   Nasal Cannula Nasal Cannula


 


O2 Flow Rate   2.00 1.00


 


FiO2 32   


 


    





 1/12/22 1/12/22 1/12/22 





 12:36 13:00 16:00 


 


Temp 36.8  37.0 


 


Pulse 81 79 75 


 


Resp 20  20 


 


B/P (MAP) 131/68 (89)  140/71 (94) 


 


Pulse Ox 95  95 


 


O2 Delivery Nasal Cannula  Nasal Cannula 


 


O2 Flow Rate 1.00  1.00 














 1/12/22





 00:00


 


Intake Total 1040 ml


 


Output Total 1150 ml


 


Balance -110 ml








Weight (Pounds):  241


Weight (Ounces):  0.0


Weight (Calculated Kilograms):  109.917672


Constitutional:  well-developed, well-nourished, other ([place], mildly confused)


Respiratory:  No accessory muscle use, No respiratory distress; chest expansion 

is symmetric, chest is bilaterally symmetric


Cardiovascular:  regular rate-rhythm; No JVD; S1 and S2


Gastrointestional:  No tender; soft, round, audible bowel sounds


Extremities:  swelling (mild leg swelling); No clubbing, No cyanosis


Neurologic/Psychiatric:  other (moves all limbs equally)


Skin:  No rash on exposed areas, No ulcerations on exposed areas





Results/Procedures:


Labs


Laboratory Tests


1/11/22 22:24: Glucometer 289H


1/12/22 04:54: Glucometer 226H


1/12/22 04:55: 


White Blood Count 8.9, Red Blood Count 3.97L, Hemoglobin 11.7L, Hematocrit 35L, 

Mean Corpuscular Volume 87, Mean Corpuscular Hemoglobin 30, Mean Corpuscular 

Hemoglobin Concent 34, Red Cell Distribution Width 12.1, Platelet Count 166, 

Mean Platelet Volume 10.8, Immature Granulocyte % (Auto) 2, Neutrophils (%) 

(Auto) 66, Lymphocytes (%) (Auto) 21, Monocytes (%) (Auto) 10, Eosinophils (%) 

(Auto) 1, Basophils (%) (Auto) 0, Neutrophils # (Auto) 5.8, Lymphocytes # (Auto)

1.9, Monocytes # (Auto) 0.9, Eosinophils # (Auto) 0.1, Basophils # (Auto) 0.0, 

Immature Granulocyte # (Auto) 0.2H, Sodium Level 132L, Potassium Level 3.9, Chl

oride Level 96L, Carbon Dioxide Level 26, Anion Gap 10, Blood Urea Nitrogen 20H,

Creatinine 1.31H, Estimat Glomerular Filtration Rate 52, BUN/Creatinine Ratio 

15, Glucose Level 230H, Calcium Level 8.5, Corrected Calcium 9.3, Magnesium 

Level 1.8, Total Bilirubin 0.7, Aspartate Amino Transf (AST/SGOT) 16, Alanine 

Aminotransferase (ALT/SGPT) 10, Alkaline Phosphatase 62, Total Protein 6.6, 

Albumin 3.0L


1/12/22 11:50: Glucometer 179H


1/12/22 16:17: Glucometer 134H





Microbiology


1/10/22 MRSA Screen - Final, Complete


          MRSA not isolated


1/10/22 Blood Culture - Preliminary, Resulted


          No growth


1/10/22 Urine Culture - Final, Complete


          Staphylococcus epidermidis





Laboratory Tests


1/10/22 18:37








1/11/22 04:15








1/12/22 04:55











A/P:


Assessment:


RLL pneumonia


- management per medical services





Mild elevation in troponin level likely Type 2 MI secondary to hypoxia





Confusion


- likely secondary to hypoxia/sepsis - improving





Chronic dyspnea, history of obesity hypoventilation syndrome.  Moderate 

obstructive and restrictive lung defect on PFT in 2017.





History of nonischemic cardiomyopathy 


- Echo done by Dr. Hinds in March 2021 reported ejection fraction 45 to 50% 

with grade 1 diastolic dysfunction, moderate left atrial dilatation, PA pressure

35 to 40 mmHg


- Last stress test was done in March 2021 showing no evidence of ischemia or 

infarction, with ejection fraction 52%





History of CVA 


- in the remote past with transient slurred speech





Carotid dz


- Mild bilateral carotid stenosis, ultrasound was done in March 2021





Hypertension


- uncontrolled





Hyperlipidemia


- statin tx





Diabetes mellitus


- poorly controlled, managed by primary care physician





History of bilateral lower extremities edema 


- secondary to chronic venous insufficiency





History of chronic kidney disease stage III


- Followed by nephrologist as out pt





History of baseline incomplete left bundle branch block.





History of obstructive sleep apnea


- diagnosed in 2017


- non-compliant with tx





Hypothyroidism


- followed and managed by primary care physician





Gastroesophageal reflux disease





Depression


Plan:





Complex management due to multiple comorbidities


I reviewed his hosp records


RLL pneumonia - management per medical services


BP not well controlled - restart home dose of Toprol


Monitor lab closely


Further recs will be based on his hospital course











ELIAS HINDS MD FACP FAC CCDS   Jan 12, 2022 16:56

## 2022-01-12 NOTE — PROGRESS NOTE - HOSPITALIST
Subjective


HPI/CC On Admission


Date Seen by Provider:  Jan 12, 2022


Time Seen by Provider:  12:00


Chief Complaint: Altered mental status





HPI: This is an 86yoWM with a past medical history of chronic lower extremity 

edema and just overall declined status and chronic debility who presented to the

ER due to UTI with altered mental status and hypoxia with elevated Troponin. 

Cardiology has been consulted. We will transfer him down to 4th floor and 

maintain Telemetry and we will have social work review what options are for Home

Health Care. He reports he walks around with a walker and a wheelchair. He 

appears to be chronically ill.


Subjective/Events-last exam


Pt is doing about the same 


Creatinine was 1.3


Studying 132


Vancomycin will be initiated for blood culture positive for staph epidermidis, 

same as urine culture


PT and OT ordered


Wife is adamant she will not place him back in a nursing home


Pt appears to be very end stage





Review of Systems


General:  Fatigue, Malaise





Focused Exam


Lactate Level


1/10/22 18:40: Lactic Acid Level 1.70








Objective


Exam


Vital Signs





Vital Signs








  Date Time  Temp Pulse Resp B/P (MAP) Pulse Ox O2 Delivery O2 Flow Rate FiO2


 


1/13/22 04:00 37.1 88 17 178/78 (111) 96 Nasal Cannula 1.00 


 


1/12/22 06:49        32





Capillary Refill : Less Than 3 Seconds


General Appearance:  No Apparent Distress, WD/WN, Chronically ill


Respiratory:  Lungs Clear, Normal Breath Sounds


Cardiovascular:  Regular Rate, Rhythm


Neurologic/Psychiatric:  Alert, Oriented x3, Depressed Affect





Results/Procedures


Lab


Patient resulted labs reviewed.





Assessment/Plan


Assessment and Plan


Assess & Plan/Chief Complaint


Assessment:


Left lower lobe PNA


Sepsis


Fall


UTI


AMS


DM


TIA


Chronic lower extremity edema


Chronic debility


HTN


CKD


Elevated troponin





Plan:


IV abx


Monitor closely


Cardiology consult


Home meds


PT OT





1/12/22:


PT OT


Abx


Monitor closely


Needs DNR discussion





Critical Care


Critically Ill Patient (see free text)





Diagnosis/Problems


Diagnosis/Problems





(1) Pneumonia


Status:  Acute


Qualifiers:  


   Pneumonia type:  due to unspecified organism  Laterality:  left  Lung 

location:  lower lobe of lung  Qualified Codes:  J18.9 - Pneumonia, unspecified 

organism


(2) Fall on same level


Status:  Acute


Qualifiers:  


   Encounter type:  initial encounter  Qualified Codes:  W18.30XA - Fall on same

level, unspecified, initial encounter


(3) Elevated troponin


Status:  Acute


(4) Altered mental status


Status:  Acute


Qualifiers:  


   Altered mental status type:  unspecified  Qualified Codes:  R41.82 - Altered 

mental status, unspecified


(5) Anasarca


Status:  Acute


(6) Urinary tract infection


Status:  Acute


Qualifiers:  


   Urinary tract infection type:  site unspecified  Hematuria presence:  without

hematuria  Qualified Codes:  N39.0 - Urinary tract infection, site not specified











CAROLYN MARIA DO                Jan 12, 2022 07:05

## 2022-01-12 NOTE — PHYSICAL THERAPY DAILY NOTE
PT Daily Note-Current


Subjective


Patient in recliner pre tx, agrees to PT, has unrated chronic back pain.





Appearance


Patient in recliner post tx with nurse call, phone, tray, chair alarm on, wife 

in room.





Mental Status


Patient Orientation:  Person, Confused, Mumbles


Attachments:  Oxygen, Fontana Catheter





Transfers


SCALE: Activities may be completed with or without assistive devices.





6-Indepedent-patient completes the activity by him/herself with no assistance 

from a helper.


5-Set-up or Clean-up Assistance-helper sets up or cleans up; patient completes 

activity. [place] assists only prior to or  


    following the activity.


4-Supervision or Touching Assistance-helper provides verbal cues and/or 

touching/steadying and/or contact guard assistance as patient completes 

activity. Assistance may be provided   


    throughout the activity or intermittently.


3-Partial/Moderate Assistance-helper does LESS THAN HALF the effort. [place] 

lifts, holds or supports trunk or limbs, but provides less than half the effort.


2-Substantial/Maximal Assistance-helper does MORE THAN HALF the effort. [place] 

lifts or holds trunk or limbs and provides more than half the effort.


9-Izbeujhhf-tngzdo does ALL the effort. Patient does none of the effort to 

complete the activity. Or, the assistance of 2 or more helpers is required for 

the patient to complete the  


    activity.


If activity was not attempted, code reason:


7-Patient Refused.


9-Not Applicable-not attempted and the patient did not perform the activity 

before the current illness, exacerbation or injury.


10-Not Attempted due to Environmental Limitations-(lack of equipment, weather 

restraints, etc.).


88-Not Attempted due to Medical Conditions or Safety Concerns.


Sit to Stand (QC):  3


Chair/Bed-to-Chair Xfer(QC):  3


mod assist for sit to stand, patient retropulsive immediately upon standing but 

recovers after a few moments





Gait Training


Distance:  20'


Walk 10 feet (QC):  4


Gait Assistive Device:  FWW


slow, unsteady but no rosi LOB





Exercises


Seated Therapy Exercises:  Ankle pumps, Long arc quads


Seated Reps:  20





Treatments


transfers, ambulation, LE strengthening





Assessment


Current Status:  Poor Progress


patient more unsteady with standing and ambulation than yesterday





PT Long Term Goals


Long Term Goals


PT Long Term Goals Time Frame:  Jan 18, 2022


Roll Left & Right (QC):  6


Sit to Lying (QC):  6


Lying-Sitting on Side/Bed(QC):  6


Sit to Stand (QC):  4


Chair/Bed-to-Chair Xfer(QC):  4


Walk 10 feet (QC):  4


Walk 50ft with 2 Turns (QC):  4





PT Plan


Problem List


Problem List:  Activity Tolerance, Functional Strength, Safety, Balance, Gait, 

Transfer, Bed Mobility, ROM





Treatment/Plan


Treatment Plan:  Continue Plan of Care


Treatment Plan:  Bed Mobility, Education, Functional Activity Chiqu[place], Functional 

Strength, Gait, Safety, Therapeutic Exercise, Transfers


Treatment Duration:  Jan 18, 2022


Frequency:  6 times per week


Estimated Hrs Per Day:  .25 hour per day


Patient and/or Family Agrees t:  Yes





Safety Risks/Education


Patient Education:  Gait Training, Transfer Techniques, Correct Positioning, 

Safety Issues


Teaching Recipient:  Patient


Teaching Methods:  Demonstration, Discussion


Response to Teaching:  Reinforcement Needed





Time/GCodes


Time In:  0930


Time Out:  0940


Total Billed Treatment Time:  10


Total Billed Treatment


1 visit


FA 10'











KRTEK,EVELINA PT                Jan 12, 2022 10:10

## 2022-01-12 NOTE — OCCUPATIONAL THER DAILY NOTE
OT Current Status-Daily Note


Subjective


Pt laughing when OT asked about pain. Unsure if pt unable to hear or comprehend.





Appearance


Left sitting in recliner, all needs within reach, alarm set.





Mental Status/Objective


Patient Orientation:  Person


Attachments:  Fontana Catheter, IV, Telemetry





ADL-Treatment


Therapy Code Descriptions/Definitions 





Functional [place] Measure:


0=Not Assessed/NA        4=Minimal Assistance


1=Total Assistance        5=Supervision or Setup


2=Maximal Assistance  6=Modified [place]


3=Moderate Assistance 7=Complete IndependenceSCALE: Activities may be completed 

with or without assistive devices.





6-Indepedent-patient completes the activity by him/herself with no assistance 

from a helper.


5-Set-up or Clean-up Assistance-helper sets up or cleans up; patient completes 

activity. [place] assists only prior to or  


    following the activity.


4-Supervision or Touching Assistance-helper provides verbal cues and/or 

touching/steadying and/or contact guard assistance as patient completes 

activity. Assistance may be provided   


    throughout the activity or intermittently.


3-Partial/Moderate Assistance-helper does LESS THAN HALF the effort. [place] 

lifts, holds or supports trunk or limbs, but provides less than half the effort.


2-Substantial/Maximal Assistance-helper does MORE THAN HALF the effort. [place] 

lifts or holds trunk or limbs and provides more than half the effort.


3-Nuhgbdwqf-vqyiqi does ALL the effort. Patient does none of the effort to 

complete the activity. Or, the assistance of 2 or more helpers is required for 

the patient to complete the  


    activity.


If activity was not attempted, code reason:


7-Patient Refused.


9-Not Applicable-not attempted and the patient did not perform the activity 

before the current illness, exacerbation or injury.


10-Not Attempted due to Environmental Limitations-(lack of equipment, weather 

restraints, etc.).


88-Not Attempted due to Medical Conditions or Safety Concerns.





Other Treatment


At OT arrival, pt transferring from bed to chair with 2 nurses. Nursing giving 

simple 1 step cues for correct sequence. Following transfer, OT educated nurse 

that pt would benefit from use of walker. Pt completed UE exercises seated in 

chair with goal to promote increased strength and endurance needed for adls. 

Visual cues required throughout full 10 reps in order for patient to follow. 

Bilateral shoulders performed within available range; ~140 degrees.





Education


OT Patient Education:  Correct positioning, Exercise program, Progress toward 

Goal/Update tx plan, Purpose of tx/functional activities, Safety issues, 

Transfer techniques


Teaching Recipient:  Patient


Teaching Methods:  Demonstration, Discussion


Response to Teaching:  Return Demonstration, Reinforcement Needed





OT Long Term Goals


Long Term Goals


Time Frame:  Jan 28, 2022


Oral Hygiene (QC):  4


Toileting Hygiene (QC):  3


Shower/Bathe Self (QC):  2


Upper Body Dressing (QC):  3


Lower Body Dressing (QC):  3


1=Demonstrate adherence to instructed precautions during ADL tasks.


2=Patient will verbalize/demonstrate understanding of assistive 

devices/modifications for ADL.


3=Patient will improve strength/tolerance for activity to enable patient to 

perform ADL's.





OT Education/Plan


Problem List/Assessment


Assessment:  Decreased Activ Tolerance, Decreased Safety Aware, Decreased UE 

Strength, Edema, Impaired Cognition, Impaired Funct Balance, Impaired Self-Care 

Skills, Restricted Funct UE ROM





Discharge Recommendations


Plan/Recommendations:  Continue POC





Treatment Plan/Plan of Care


Treatment,Training & Education:  Yes


Patient would benefit from OT for education, treatment and training to promote 

independence in ADL's, mobility, safety and/or upper extremity function for 

ADL's.


Plan of Care:  ADL Retraining, Caregiver Training, Functional Mobility, UE Funct

Exercise/Act


Treatment Duration:  Jan 25, 2022


Frequency:  3 times per week


Estimated Hrs Per Day:  .25 hour per day


Rehab Potential:  Poor





Time/GCodes


Start Time:  08:41


Stop Time:  08:51


Total Time Billed (hr/min):  10


Billed Treatment Time


1 visit


EX











Salma Poe OT                Jan 12, 2022 09:27

## 2022-01-12 NOTE — PROGRESS NOTE - CARDIOLOGY
Cardiology SOAP Progress Note


Objective:


I&O/Vital Signs











 1/12/22 1/13/22 1/13/22 1/13/22





 22:00 00:00 01:00 03:15


 


Temp  36.8  


 


Pulse  83 80 


 


Resp  21  


 


B/P (MAP)  173/81 (111)  


 


Pulse Ox  93  95


 


O2 Delivery Nasal Cannula Nasal Cannula  Nasal Cannula


 


O2 Flow Rate 1.00 1.00  1.00


 


    





 1/13/22 1/13/22 1/13/22 





 04:00 07:00 08:11 


 


Temp 37.1  37.1 


 


Pulse 88 93 96 


 


Resp 17  24 


 


B/P (MAP) 178/78 (111)  173/84 (113) 


 


Pulse Ox 96  92 


 


O2 Delivery Nasal Cannula  Nasal Cannula 


 


O2 Flow Rate 1.00  1.00 














 1/13/22





 00:00


 


Intake Total 1385 ml


 


Output Total 1300 ml


 


Balance 85 ml








Weight (Pounds):  241


Weight (Ounces):  0.0


Weight (Calculated Kilograms):  109.910018


Constitutional:  well-developed, well-nourished, other ([place])


Respiratory:  No accessory muscle use, No respiratory distress; chest expansion 

is symmetric, chest is bilaterally symmetric


Cardiovascular:  regular rate-rhythm; No JVD; S1 and S2


Gastrointestional:  No tender; soft, round, audible bowel sounds


Neurologic/Psychiatric:  grossly intact (moves all extremities)


Skin:  No rash on exposed areas, No ulcerations on exposed areas





Results/Procedures:


Labs


Laboratory Tests


1/12/22 11:50: Glucometer 179H


1/12/22 16:17: Glucometer 134H


1/12/22 20:06: Glucometer 139H


1/13/22 05:09: Glucometer 133H


1/13/22 05:55: 


White Blood Count 8.2, Red Blood Count 3.99L, Hemoglobin 11.9L, Hematocrit 35L, 

Mean Corpuscular Volume 87, Mean Corpuscular Hemoglobin 30, Mean Corpuscular 

Hemoglobin Concent 34, Red Cell Distribution Width 12.0, Platelet Count 181, 

Mean Platelet Volume 10.5, Immature Granulocyte % (Auto) 3, Neutrophils (%) 

(Auto) 65, Lymphocytes (%) (Auto) 21, Monocytes (%) (Auto) 9, Eosinophils (%) 

(Auto) 2, Basophils (%) (Auto) 0, Neutrophils # (Auto) 5.3, Lymphocytes # (Auto)

1.7, Monocytes # (Auto) 0.8, Eosinophils # (Auto) 0.1, Basophils # (Auto) 0.0, 

Immature Granulocyte # (Auto) 0.2H, Sodium Level 135, Potassium Level 4.0, 

Chloride Level 99, Carbon Dioxide Level 25, Anion Gap 11, Blood Urea Nitrogen 

17, Creatinine 1.36H, Estimat Glomerular Filtration Rate 50, BUN/Creatinine 

Ratio 13, Glucose Level 149H, Calcium Level 9.0, Corrected Calcium 9.8, 

Magnesium Level 1.6, Total Bilirubin 0.7, Aspartate Amino Transf (AST/SGOT) 29, 

Alanine Aminotransferase (ALT/SGPT) 18, Alkaline Phosphatase 76, Total Protein 

6.6, Albumin 3.0L





Microbiology


1/10/22 MRSA Screen - Final, Complete


          MRSA not isolated


1/10/22 Blood Culture - Preliminary, Resulted


          No growth


1/10/22 Urine Culture - Final, Complete


          Staphylococcus epidermidis








A/P:


Assessment:


RLL pneumonia


- management per medical services





Mild elevation in troponin level likely Type 2 MI secondary to hypoxia





Confusion


- likely secondary to hypoxia/sepsis - improving





Chronic dyspnea, history of obesity hypoventilation syndrome.  Moderate 

obstructive and restrictive lung defect on PFT in 2017.





History of nonischemic cardiomyopathy 


- Echo done by Dr. Hinds in March 2021 reported ejection fraction 45 to 50% 

with grade 1 diastolic dysfunction, moderate left atrial dilatation, PA pressure

35 to 40 mmHg


- Last stress test was done in March 2021 showing no evidence of ischemia or 

infarction, with ejection fraction 52%





History of CVA 


- in the remote past with transient slurred speech





Carotid dz


- Mild bilateral carotid stenosis, ultrasound was done in March 2021





Hypertension


- uncontrolled





Hyperlipidemia


- statin tx





Diabetes mellitus


- poorly controlled, managed by primary care physician





History of bilateral lower extremities edema 


- secondary to chronic venous insufficiency





History of chronic kidney disease stage III


- Followed by nephrologist as out pt





History of baseline incomplete left bundle branch block.





History of obstructive sleep apnea


- diagnosed in 2017


- non-compliant with tx





Hypothyroidism


- followed and managed by primary care physician





Gastroesophageal reflux disease





Depression


Plan:


RLL pneumonia - management per medical services


BP not well controlled - restart home dose of Toprol


Monitor lab closely


Further recs will be based on his hospital course


We have reviewed the progress notes from Dr. Ward in detail











JADE MARIN           Jan 12, 2022 08:43

## 2022-01-13 VITALS — SYSTOLIC BLOOD PRESSURE: 144 MMHG | DIASTOLIC BLOOD PRESSURE: 77 MMHG

## 2022-01-13 VITALS — SYSTOLIC BLOOD PRESSURE: 158 MMHG | DIASTOLIC BLOOD PRESSURE: 75 MMHG

## 2022-01-13 VITALS — DIASTOLIC BLOOD PRESSURE: 84 MMHG | SYSTOLIC BLOOD PRESSURE: 173 MMHG

## 2022-01-13 VITALS — DIASTOLIC BLOOD PRESSURE: 81 MMHG | SYSTOLIC BLOOD PRESSURE: 173 MMHG

## 2022-01-13 VITALS — DIASTOLIC BLOOD PRESSURE: 74 MMHG | SYSTOLIC BLOOD PRESSURE: 153 MMHG

## 2022-01-13 VITALS — SYSTOLIC BLOOD PRESSURE: 178 MMHG | DIASTOLIC BLOOD PRESSURE: 78 MMHG

## 2022-01-13 LAB
ALBUMIN SERPL-MCNC: 3 GM/DL (ref 3.2–4.5)
ALP SERPL-CCNC: 76 U/L (ref 40–136)
ALT SERPL-CCNC: 18 U/L (ref 0–55)
BASOPHILS # BLD AUTO: 0 10^3/UL (ref 0–0.1)
BASOPHILS NFR BLD AUTO: 0 % (ref 0–10)
BILIRUB SERPL-MCNC: 0.7 MG/DL (ref 0.1–1)
BUN/CREAT SERPL: 13
CALCIUM SERPL-MCNC: 9 MG/DL (ref 8.5–10.1)
CHLORIDE SERPL-SCNC: 99 MMOL/L (ref 98–107)
CO2 SERPL-SCNC: 25 MMOL/L (ref 21–32)
CREAT SERPL-MCNC: 1.36 MG/DL (ref 0.6–1.3)
EOSINOPHIL # BLD AUTO: 0.1 10^3/UL (ref 0–0.3)
EOSINOPHIL NFR BLD AUTO: 2 % (ref 0–10)
GFR SERPLBLD BASED ON 1.73 SQ M-ARVRAT: 50 ML/MIN
GLUCOSE SERPL-MCNC: 149 MG/DL (ref 70–105)
HCT VFR BLD CALC: 35 % (ref 40–54)
HGB BLD-MCNC: 11.9 G/DL (ref 13.3–17.7)
LYMPHOCYTES # BLD AUTO: 1.7 10^3/UL (ref 1–4)
LYMPHOCYTES NFR BLD AUTO: 21 % (ref 12–44)
MAGNESIUM SERPL-MCNC: 1.6 MG/DL (ref 1.6–2.4)
MANUAL DIFFERENTIAL PERFORMED BLD QL: NO
MCH RBC QN AUTO: 30 PG (ref 25–34)
MCHC RBC AUTO-ENTMCNC: 34 G/DL (ref 32–36)
MCV RBC AUTO: 87 FL (ref 80–99)
MONOCYTES # BLD AUTO: 0.8 10^3/UL (ref 0–1)
MONOCYTES NFR BLD AUTO: 9 % (ref 0–12)
NEUTROPHILS # BLD AUTO: 5.3 10^3/UL (ref 1.8–7.8)
NEUTROPHILS NFR BLD AUTO: 65 % (ref 42–75)
PLATELET # BLD: 181 10^3/UL (ref 130–400)
PMV BLD AUTO: 10.5 FL (ref 9–12.2)
POTASSIUM SERPL-SCNC: 4 MMOL/L (ref 3.6–5)
PROT SERPL-MCNC: 6.6 GM/DL (ref 6.4–8.2)
SODIUM SERPL-SCNC: 135 MMOL/L (ref 135–145)
WBC # BLD AUTO: 8.2 10^3/UL (ref 4.3–11)

## 2022-01-13 RX ADMIN — ENOXAPARIN SODIUM SCH MG: 100 INJECTION SUBCUTANEOUS at 12:48

## 2022-01-13 RX ADMIN — CLOPIDOGREL BISULFATE SCH MG: 75 TABLET, FILM COATED ORAL at 09:08

## 2022-01-13 RX ADMIN — INSULIN ASPART SCH UNIT: 100 INJECTION, SOLUTION INTRAVENOUS; SUBCUTANEOUS at 06:41

## 2022-01-13 RX ADMIN — INSULIN ASPART SCH UNIT: 100 INJECTION, SOLUTION INTRAVENOUS; SUBCUTANEOUS at 09:07

## 2022-01-13 RX ADMIN — MECLIZINE HYDROCHLORIDE SCH MG: 25 TABLET ORAL at 20:36

## 2022-01-13 RX ADMIN — PANTOPRAZOLE SODIUM SCH MG: 40 TABLET, DELAYED RELEASE ORAL at 17:10

## 2022-01-13 RX ADMIN — Medication SCH MCG: at 09:08

## 2022-01-13 RX ADMIN — IPRATROPIUM BROMIDE AND ALBUTEROL SULFATE SCH ML: .5; 3 SOLUTION RESPIRATORY (INHALATION) at 22:14

## 2022-01-13 RX ADMIN — ASPIRIN SCH MG: 81 TABLET ORAL at 09:07

## 2022-01-13 RX ADMIN — MECLIZINE HYDROCHLORIDE SCH MG: 25 TABLET ORAL at 12:53

## 2022-01-13 RX ADMIN — SODIUM CHLORIDE SCH MLS/HR: 900 INJECTION, SOLUTION INTRAVENOUS at 03:03

## 2022-01-13 RX ADMIN — SPIRONOLACTONE SCH MG: 25 TABLET ORAL at 09:07

## 2022-01-13 RX ADMIN — SODIUM CHLORIDE SCH MLS/HR: 900 INJECTION INTRAVENOUS at 12:48

## 2022-01-13 RX ADMIN — METOPROLOL SUCCINATE SCH MG: 50 TABLET, EXTENDED RELEASE ORAL at 09:07

## 2022-01-13 RX ADMIN — Medication SCH ML: at 12:54

## 2022-01-13 RX ADMIN — LEVOTHYROXINE SODIUM SCH MCG: 150 TABLET ORAL at 06:39

## 2022-01-13 RX ADMIN — ENALAPRIL MALEATE SCH MG: 5 TABLET ORAL at 09:08

## 2022-01-13 RX ADMIN — INSULIN ASPART SCH UNIT: 100 INJECTION, SOLUTION INTRAVENOUS; SUBCUTANEOUS at 16:16

## 2022-01-13 RX ADMIN — IPRATROPIUM BROMIDE AND ALBUTEROL SULFATE SCH ML: .5; 3 SOLUTION RESPIRATORY (INHALATION) at 09:37

## 2022-01-13 RX ADMIN — TAMSULOSIN HYDROCHLORIDE SCH MG: 0.4 CAPSULE ORAL at 09:08

## 2022-01-13 RX ADMIN — Medication SCH ML: at 06:40

## 2022-01-13 RX ADMIN — Medication SCH EA: at 06:40

## 2022-01-13 RX ADMIN — IPRATROPIUM BROMIDE AND ALBUTEROL SULFATE SCH ML: .5; 3 SOLUTION RESPIRATORY (INHALATION) at 03:09

## 2022-01-13 RX ADMIN — AMLODIPINE BESYLATE SCH MG: 5 TABLET ORAL at 09:07

## 2022-01-13 RX ADMIN — ENALAPRIL MALEATE SCH MG: 5 TABLET ORAL at 20:36

## 2022-01-13 RX ADMIN — SIMVASTATIN SCH MG: 40 TABLET, FILM COATED ORAL at 09:07

## 2022-01-13 RX ADMIN — SODIUM CHLORIDE SCH MLS/HR: 900 INJECTION INTRAVENOUS at 18:22

## 2022-01-13 RX ADMIN — IPRATROPIUM BROMIDE AND ALBUTEROL SULFATE SCH ML: .5; 3 SOLUTION RESPIRATORY (INHALATION) at 15:11

## 2022-01-13 RX ADMIN — OMEGA-3 FATTY ACIDS CAP 1000 MG SCH MG: 1000 CAP at 09:08

## 2022-01-13 RX ADMIN — SODIUM CHLORIDE SCH MLS/HR: 900 INJECTION INTRAVENOUS at 04:08

## 2022-01-13 RX ADMIN — Medication SCH ML: at 20:37

## 2022-01-13 RX ADMIN — INSULIN ASPART SCH UNIT: 100 INJECTION, SOLUTION INTRAVENOUS; SUBCUTANEOUS at 20:36

## 2022-01-13 RX ADMIN — MECLIZINE HYDROCHLORIDE SCH MG: 25 TABLET ORAL at 09:07

## 2022-01-13 RX ADMIN — INSULIN ASPART SCH UNIT: 100 INJECTION, SOLUTION INTRAVENOUS; SUBCUTANEOUS at 12:48

## 2022-01-13 NOTE — PROGRESS NOTE - CARDIOLOGY
Cardiology SOAP Progress Note


Subjective:


Lying in bed


Opens eyes when spoken to, but does not respond to questions





Objective:


I&O/Vital Signs











 1/13/22 1/13/22 1/13/22 1/13/22





 04:00 07:00 08:00 08:11


 


Temp 37.1   37.1


 


Pulse 88 93  96


 


Resp 17   24


 


B/P (MAP) 178/78 (111)   173/84 (113)


 


Pulse Ox 96   92


 


O2 Delivery Nasal Cannula  Nasal Cannula Nasal Cannula


 


O2 Flow Rate 1.00  1.00 1.00


 


    





 1/13/22 1/13/22 1/13/22 1/13/22





 09:38 12:25 15:12 15:34


 


Temp  37.0  


 


Pulse  80  


 


Resp  22  


 


B/P (MAP)  153/74 (100)  


 


Pulse Ox 92 93 99 94


 


O2 Delivery Nasal Cannula Nasal Cannula Nasal Cannula Nasal Cannula


 


O2 Flow Rate 1.00 1.00 5.00 2.00














 1/13/22





 00:00


 


Intake Total 1385 ml


 


Output Total 1300 ml


 


Balance 85 ml








Weight (Pounds):  241


Weight (Ounces):  0.0


Weight (Calculated Kilograms):  109.283300


Constitutional:  No AAO x 3 (Does not respond to questions; opens eyes); well-

developed, well-nourished, other ([place])


Respiratory:  No accessory muscle use, No respiratory distress; chest expansion 

is symmetric, chest is bilaterally symmetric, other (fair air entry)


Cardiovascular:  regular rate-rhythm; No JVD; S1 and S2


Gastrointestional:  No tender; soft, round, audible bowel sounds


Extremities:  swelling (mod leg swelling with wraps in place); No clubbing, No 

cyanosis


Neurologic/Psychiatric:  other (moves all limbs equally)


Skin:  No rash on exposed areas, No ulcerations on exposed areas





Results/Procedures:


Labs


Laboratory Tests


1/12/22 16:17: Glucometer 134H


1/12/22 20:06: Glucometer 139H


1/13/22 05:09: Glucometer 133H


1/13/22 05:55: 


White Blood Count 8.2, Red Blood Count 3.99L, Hemoglobin 11.9L, Hematocrit 35L, 

Mean Corpuscular Volume 87, Mean Corpuscular Hemoglobin 30, Mean Corpuscular 

Hemoglobin Concent 34, Red Cell Distribution Width 12.0, Platelet Count 181, 

Mean Platelet Volume 10.5, Immature Granulocyte % (Auto) 3, Neutrophils (%) 

(Auto) 65, Lymphocytes (%) (Auto) 21, Monocytes (%) (Auto) 9, Eosinophils (%) 

(Auto) 2, Basophils (%) (Auto) 0, Neutrophils # (Auto) 5.3, Lymphocytes # (Auto)

1.7, Monocytes # (Auto) 0.8, Eosinophils # (Auto) 0.1, Basophils # (Auto) 0.0, 

Immature Granulocyte # (Auto) 0.2H, Sodium Level 135, Potassium Level 4.0, 

Chloride Level 99, Carbon Dioxide Level 25, Anion Gap 11, Blood Urea Nitrogen 

17, Creatinine 1.36H, Estimat Glomerular Filtration Rate 50, BUN/Creatinine 

Ratio 13, Glucose Level 149H, Calcium Level 9.0, Corrected Calcium 9.8, 

Magnesium Level 1.6, Total Bilirubin 0.7, Aspartate Amino Transf (AST/SGOT) 29, 

Alanine Aminotransferase (ALT/SGPT) 18, Alkaline Phosphatase 76, Total Protein 

6.6, Albumin 3.0L


1/13/22 11:16: Glucometer 186H





Microbiology


1/10/22 MRSA Screen - Final, Complete


          MRSA not isolated


1/10/22 Blood Culture - Preliminary, Resulted


          No growth


1/10/22 Urine Culture - Final, Complete


          Staphylococcus epidermidis








A/P:


Assessment:


RLL pneumonia


- management per medical services





Mild elevation in troponin level likely Type 2 MI secondary to hypoxia





Confusion


- likely secondary to hypoxia/sepsis - improving





Chronic dyspnea, history of obesity hypoventilation syndrome.  Moderate 

obstructive and restrictive lung defect on PFT in 2017.





History of nonischemic cardiomyopathy 


- Echo done by Dr. Hinds in March 2021 reported ejection fraction 45 to 50% 

with grade 1 diastolic dysfunction, moderate left atrial dilatation, PA pressure

35 to 40 mmHg


- Last stress test was done in March 2021 showing no evidence of ischemia or 

infarction, with ejection fraction 52%





History of CVA 


- in the remote past with transient slurred speech





Carotid dz


- Mild bilateral carotid stenosis, ultrasound was done in March 2021





Hypertension


- uncontrolled





Hyperlipidemia


- statin tx





Diabetes mellitus


- poorly controlled, managed by primary care physician





History of bilateral lower extremities edema 


- secondary to chronic venous insufficiency





History of chronic kidney disease stage III


- Followed by nephrologist as out pt





History of baseline incomplete left bundle branch block.





History of obstructive sleep apnea


- diagnosed in 2017


- non-compliant with tx





Hypothyroidism


- followed and managed by primary care physician





Gastroesophageal reflux disease





Depression


Plan:





Complex management due to multiple comorbidities


RLL pneumonia - management per medical services


BP not well controlled - increase Toprol XL dose


Monitor lab closely


Replace electrolytes as indicated


Further recs will be based on his hospital course











JADE MARIN           Jan 13, 2022 08:36

## 2022-01-13 NOTE — PROGRESS NOTE - CARDIOLOGY
Cardiology SOAP Progress Note


Subjective:


No cp or palp or syncope


Shortness of breath with exertion


Gen malaise and weakness


No n/v/d





Objective:


I&O/Vital Signs











 1/13/22 1/13/22 1/13/22 1/13/22





 01:00 03:15 04:00 07:00


 


Temp   37.1 


 


Pulse 80  88 93


 


Resp   17 


 


B/P (MAP)   178/78 (111) 


 


Pulse Ox  95 96 


 


O2 Delivery  Nasal Cannula Nasal Cannula 


 


O2 Flow Rate  1.00 1.00 


 


    





 1/13/22 1/13/22 1/13/22 





 08:00 08:11 09:38 


 


Temp  37.1  


 


Pulse  96  


 


Resp  24  


 


B/P (MAP)  173/84 (113)  


 


Pulse Ox  92 92 


 


O2 Delivery Nasal Cannula Nasal Cannula Nasal Cannula 


 


O2 Flow Rate 1.00 1.00 1.00 














 1/13/22





 00:00


 


Intake Total 1385 ml


 


Output Total 1300 ml


 


Balance 85 ml








Weight (Pounds):  241


Weight (Ounces):  0.0


Weight (Calculated Kilograms):  109.643446


Constitutional:  No AAO x 3 (Does not respond to questions; opens eyes); well-

developed, well-nourished, other ([place])


Respiratory:  No accessory muscle use, No respiratory distress; chest expansion 

is symmetric, chest is bilaterally symmetric, other (fair air entry)


Cardiovascular:  regular rate-rhythm; No JVD; S1 and S2


Gastrointestional:  No tender; soft, round, audible bowel sounds


Extremities:  swelling (mod leg swelling with wraps in place); No clubbing, No 

cyanosis


Neurologic/Psychiatric:  other (moves all limbs equally)


Skin:  No rash on exposed areas, No ulcerations on exposed areas





Results/Procedures:


Labs


Laboratory Tests


1/12/22 16:17: Glucometer 134H


1/12/22 20:06: Glucometer 139H


1/13/22 05:09: Glucometer 133H


1/13/22 05:55: 


White Blood Count 8.2, Red Blood Count 3.99L, Hemoglobin 11.9L, Hematocrit 35L, 

Mean Corpuscular Volume 87, Mean Corpuscular Hemoglobin 30, Mean Corpuscular 

Hemoglobin Concent 34, Red Cell Distribution Width 12.0, Platelet Count 181, 

Mean Platelet Volume 10.5, Immature Granulocyte % (Auto) 3, Neutrophils (%) 

(Auto) 65, Lymphocytes (%) (Auto) 21, Monocytes (%) (Auto) 9, Eosinophils (%) 

(Auto) 2, Basophils (%) (Auto) 0, Neutrophils # (Auto) 5.3, Lymphocytes # (Auto)

1.7, Monocytes # (Auto) 0.8, Eosinophils # (Auto) 0.1, Basophils # (Auto) 0.0, 

Immature Granulocyte # (Auto) 0.2H, Sodium Level 135, Potassium Level 4.0, 

Chloride Level 99, Carbon Dioxide Level 25, Anion Gap 11, Blood Urea Nitrogen 

17, Creatinine 1.36H, Estimat Glomerular Filtration Rate 50, BUN/Creatinine 

Ratio 13, Glucose Level 149H, Calcium Level 9.0, Corrected Calcium 9.8, 

Magnesium Level 1.6, Total Bilirubin 0.7, Aspartate Amino Transf (AST/SGOT) 29, 

Alanine Aminotransferase (ALT/SGPT) 18, Alkaline Phosphatase 76, Total Protein 

6.6, Albumin 3.0L


1/13/22 11:16: Glucometer 186H





Microbiology


1/10/22 MRSA Screen - Final, Complete


          MRSA not isolated


1/10/22 Blood Culture - Preliminary, Resulted


          No growth


1/10/22 Urine Culture - Final, Complete


          Staphylococcus epidermidis





Laboratory Tests


1/12/22 04:55








1/13/22 05:55











A/P:


Assessment:


RLL pneumonia


- management per medical services





Mild elevation in troponin level likely Type 2 MI secondary to hypoxia





Confusion


- likely secondary to hypoxia/sepsis - improving





Chronic dyspnea, history of obesity hypoventilation syndrome.  Moderate 

obstructive and restrictive lung defect on PFT in 2017.





History of nonischemic cardiomyopathy 


- Echo done by Dr. Hinds in March 2021 reported ejection fraction 45 to 50% 

with grade 1 diastolic dysfunction, moderate left atrial dilatation, PA pressure

35 to 40 mmHg


- Last stress test was done in March 2021 showing no evidence of ischemia or 

infarction, with ejection fraction 52%





History of CVA 


- in the remote past with transient slurred speech





Carotid dz


- Mild bilateral carotid stenosis, ultrasound was done in March 2021





Hypertension


- uncontrolled





Hyperlipidemia


- statin tx





Diabetes mellitus


- poorly controlled, managed by primary care physician





History of bilateral lower extremities edema 


- secondary to chronic venous insufficiency





History of chronic kidney disease stage III


- Followed by nephrologist as out pt





History of baseline incomplete left bundle branch block.





History of obstructive sleep apnea


- diagnosed in 2017


- non-compliant with tx





Hypothyroidism


- followed and managed by primary care physician





Gastroesophageal reflux disease





Depression


Plan:





Complex management due to multiple comorbidities


RLL pneumonia - management per medical services


BP not well controlled - increase Toprol XL dose


Monitor lab closely


Replace electrolytes as indicated


Further recs will be based on his hospital course











ELIAS HINDS MD FACP FAC CCDS   Jan 13, 2022 12:09

## 2022-01-13 NOTE — OCCUPATIONAL THER DAILY NOTE
OT Current Status-Daily Note


Subjective


Pt pleasantly confused, unable to verbalize if having any pain. Does not appear 

to be in any distress.





Appearance


Returned to sitting in recliner, all needs within reach.





Mental Status/Objective


Patient Orientation:  Person, Confused


Attachments:  Fontana Catheter, IV, Oxygen





ADL-Treatment


Therapy Code Descriptions/Definitions 





Functional [place] Measure:


0=Not Assessed/NA        4=Minimal Assistance


1=Total Assistance        5=Supervision or Setup


2=Maximal Assistance  6=Modified [place]


3=Moderate Assistance 7=Complete IndependenceSCALE: Activities may be completed 

with or without assistive devices.





6-Indepedent-patient completes the activity by him/herself with no assistance 

from a helper.


5-Set-up or Clean-up Assistance-helper sets up or cleans up; patient completes 

activity. [place] assists only prior to or  


    following the activity.


4-Supervision or Touching Assistance-helper provides verbal cues and/or 

touching/steadying and/or contact guard assistance as patient completes 

activity. Assistance may be provided   


    throughout the activity or intermittently.


3-Partial/Moderate Assistance-helper does LESS THAN HALF the effort. [place] 

lifts, holds or supports trunk or limbs, but provides less than half the effort.


2-Substantial/Maximal Assistance-helper does MORE THAN HALF the effort. [place] 

lifts or holds trunk or limbs and provides more than half the effort.


1-Hrtjhsjoh-djjbcv does ALL the effort. Patient does none of the effort to 

complete the activity. Or, the assistance of 2 or more helpers is required for 

the patient to complete the  


    activity.


If activity was not attempted, code reason:


7-Patient Refused.


9-Not Applicable-not attempted and the patient did not perform the activity 

before the current illness, exacerbation or injury.


10-Not Attempted due to Environmental Limitations-(lack of equipment, weather 

restraints, etc.).


88-Not Attempted due to Medical Conditions or Safety Concerns.


Lower Body Dressing (QC):  1


On/Off Footwear:  1


Toileting Hygiene (QC):  1





Other Treatment


Pt sitting in recliner at OT arrival. Focus of activity on improving standing 

balance and endurance needed for adls and transfers. Pt stood x2 with Min a from

low recliner. Retropulsive upon standing, cues to shift weight. Pt unsteady on 

feet. In static standing, frequent cues needed to keep walker closer to body as 

pt had tendency to push too far forward despite not moving. Cues for posture and

improved SOHAIL. At this time, pt requires BUE support to maintain balance, thus he

would be dependent for clothing management pre/post toileting. Repetition and 

simplification of cues needed. 2 standing bouts for ~2-3 min each. Wife 

questioning if pt will need to go home on oxygen. Pt currently on 1L. OT did 

educate wife on purchase and use of pulse ox.





Education


OT Patient Education:  Correct positioning, Modified ADL techniques, Progress 

toward Goal/Update tx plan, Purpose of tx/functional activities, Safety issues, 

Transfer techniques


Teaching Recipient:  Patient, Family


Teaching Methods:  Discussion


Response to Teaching:  Verbalize Understanding, Unable to Comprehend, 

Reinforcement Needed





OT Long Term Goals


Long Term Goals


Time Frame:  Jan 28, 2022


Oral Hygiene (QC):  4


Toileting Hygiene (QC):  3


Shower/Bathe Self (QC):  2


Upper Body Dressing (QC):  3


Lower Body Dressing (QC):  3


1=Demonstrate adherence to instructed precautions during ADL tasks.


2=Patient will verbalize/demonstrate understanding of assistive 

devices/modifications for ADL.


3=Patient will improve strength/tolerance for activity to enable patient to 

perform ADL's.





OT Education/Plan


Problem List/Assessment


Assessment:  Decreased Activ Tolerance, Decreased Safety Aware, Decreased UE 

Strength, Edema, Impaired Cognition, Impaired Funct Balance, Impaired Self-Care 

Skills





Discharge Recommendations


Plan/Recommendations:  Continue POC





Treatment Plan/Plan of Care


Treatment,Training & Education:  Yes


Patient would benefit from OT for education, treatment and training to promote 

independence in ADL's, mobility, safety and/or upper extremity function for 

ADL's.


Plan of Care:  ADL Retraining, Caregiver Training, Functional Mobility, UE Funct

Exercise/Act


Treatment Duration:  Jan 25, 2022


Frequency:  3 times per week


Estimated Hrs Per Day:  .25 hour per day


Rehab Potential:  Poor





Time/GCodes


Start Time:  09:50


Stop Time:  10:05


Total Time Billed (hr/min):  15


Billed Treatment Time


1, Salma Mccarty OT                Jan 13, 2022 10:13

## 2022-01-13 NOTE — PROGRESS NOTE - HOSPITALIST
Subjective


HPI/CC On Admission


Date Seen by Provider:  Jan 13, 2022


Time Seen by Provider:  10:00


Chief Complaint: Altered mental status





HPI: This is an 86yoWM with a past medical history of chronic lower extremity 

edema and just overall declined status and chronic debility who presented to the

ER due to UTI with altered mental status and hypoxia with elevated Troponin. 

Cardiology has been consulted. We will transfer him down to 4th floor and 

maintain Telemetry and we will have social work review what options are for Home

Health Care. He reports he walks around with a walker and a wheelchair. He 

appears to be chronically ill.


Subjective/Events-last exam


Pt doing about the same 


Very weak


PT/OT ordered


Swing-bed ordered


Wife unrealistic on how she can take care of him in this state, I would re

commend Nursing Home


Labs reviewed 


Zosyn maintained





Review of Systems


General:  Fatigue, Malaise


Neurological:  Confusion





Focused Exam


Lactate Level








Objective


Exam


Vital Signs





Vital Signs








  Date Time  Temp Pulse Resp B/P (MAP) Pulse Ox O2 Delivery O2 Flow Rate FiO2


 


1/14/22 04:16 36.6 78 18 169/90 (116) 90 Nasal Cannula 2.00 


 


1/12/22 06:49        32





Capillary Refill : Less Than 3 Seconds


General Appearance:  No Apparent Distress, WD/WN, Chronically ill


Respiratory:  No Accessory Muscle Use, No Respiratory Distress, Decreased Breath

Sounds


Cardiovascular:  Regular Rate, Rhythm


Neurologic/Psychiatric:  Alert, No Motor/Sensory Deficits, Depressed Affect, Di

soriented





Results/Procedures


Lab


Laboratory Tests


1/13/22 05:55








Patient resulted labs reviewed.





Assessment/Plan


Assessment and Plan


Assess & Plan/Chief Complaint


Assessment:


Left lower lobe PNA


Sepsis


Fall


UTI


AMS


DM


TIA


Chronic lower extremity edema


Chronic debility


HTN


CKD


Elevated troponin





Plan:


IV abx


Monitor closely


Cardiology consult


Home meds


PT OT





1/12/22:


PT OT


Abx


Monitor closely


Needs DNR discussion





1/13/22:


Needs NHP


Poor prognosis


Needs DNR





Critical Care


Critically Ill Patient (see free text)





Diagnosis/Problems


Diagnosis/Problems





(1) Pneumonia


Status:  Acute


Qualifiers:  


   Pneumonia type:  due to unspecified organism  Laterality:  left  Lung 

location:  lower lobe of lung  Qualified Codes:  J18.9 - Pneumonia, unspecified 

organism


(2) Fall on same level


Status:  Acute


Qualifiers:  


   Encounter type:  initial encounter  Qualified Codes:  W18.30XA - Fall on same

level, unspecified, initial encounter


(3) Elevated troponin


Status:  Acute


(4) Altered mental status


Status:  Acute


Qualifiers:  


   Altered mental status type:  unspecified  Qualified Codes:  R41.82 - Altered 

mental status, unspecified


(5) Anasarca


Status:  Acute


(6) Urinary tract infection


Status:  Acute


Qualifiers:  


   Urinary tract infection type:  site unspecified  Hematuria presence:  without

hematuria  Qualified Codes:  N39.0 - Urinary tract infection, site not specified











CAROLYN MARIA DO                Jan 13, 2022 06:13

## 2022-01-13 NOTE — PHYSICAL THERAPY DAILY NOTE
PT Daily Note-Current


Subjective


Patient is in bed and requests to be up in chair for breakfast.





Mental Status


Attachments:  Oxygen, Fontana Catheter, IV





Transfers


SCALE: Activities may be completed with or without assistive devices.





6-Indepedent-patient completes the activity by him/herself with no assistance 

from a helper.


5-Set-up or Clean-up Assistance-helper sets up or cleans up; patient completes 

activity. [place] assists only prior to or  


    following the activity.


4-Supervision or Touching Assistance-helper provides verbal cues and/or 

touching/steadying and/or contact guard assistance as patient completes 

activity. Assistance may be provided   


    throughout the activity or intermittently.


3-Partial/Moderate Assistance-helper does LESS THAN HALF the effort. [place] 

lifts, holds or supports trunk or limbs, but provides less than half the effort.


2-Substantial/Maximal Assistance-helper does MORE THAN HALF the effort. [place] 

lifts or holds trunk or limbs and provides more than half the effort.


8-Ybgyfgjtk-qvdhti does ALL the effort. Patient does none of the effort to 

complete the activity. Or, the assistance of 2 or more helpers is required for 

the patient to complete the  


    activity.


If activity was not attempted, code reason:


7-Patient Refused.


9-Not Applicable-not attempted and the patient did not perform the activity 

before the current illness, exacerbation or injury.


10-Not Attempted due to Environmental Limitations-(lack of equipment, weather 

restraints, etc.).


88-Not Attempted due to Medical Conditions or Safety Concerns.


Lying to Sitting/Side of Bed(Q:  3


Sit to Stand (QC):  3


Chair/Bed-to-Chair Xfer(QC):  3





Gait Training


Distance:  15'


Walk 10 feet (QC):  3


Gait Assistive Device:  FWW


slightly retropulsive with PT correct





Assessment


Patient is up in recliner with needs met.  PT to increase activity as patient 

tolerates.  Patient bilateral distal LE ACE wrapped to reduce edema.





PT Long Term Goals


Long Term Goals


PT Long Term Goals Time Frame:  Jan 18, 2022


Roll Left & Right (QC):  6


Sit to Lying (QC):  6


Lying-Sitting on Side/Bed(QC):  6


Sit to Stand (QC):  4


Chair/Bed-to-Chair Xfer(QC):  4


Walk 10 feet (QC):  4


Walk 50ft with 2 Turns (QC):  4





PT Plan


Treatment/Plan


Treatment Plan:  Continue Plan of Care


Treatment Plan:  Bed Mobility, Education, Functional Activity [place], Functional 

Strength, Gait, Safety, Therapeutic Exercise, Transfers


Treatment Duration:  Jan 18, 2022


Frequency:  6 times per week


Estimated Hrs Per Day:  .25 hour per day


Patient and/or Family Agrees t:  Yes





Time/GCodes


Time In:  838


Time Out:  847


Total Billed Treatment Time:  9


Total Billed Treatment


1 visit


FA 9 min











ALAINA MADRID PT              Jan 13, 2022 10:19

## 2022-01-14 VITALS — DIASTOLIC BLOOD PRESSURE: 70 MMHG | SYSTOLIC BLOOD PRESSURE: 137 MMHG

## 2022-01-14 VITALS — SYSTOLIC BLOOD PRESSURE: 146 MMHG | DIASTOLIC BLOOD PRESSURE: 71 MMHG

## 2022-01-14 VITALS — SYSTOLIC BLOOD PRESSURE: 169 MMHG | DIASTOLIC BLOOD PRESSURE: 90 MMHG

## 2022-01-14 VITALS — DIASTOLIC BLOOD PRESSURE: 72 MMHG | SYSTOLIC BLOOD PRESSURE: 147 MMHG

## 2022-01-14 VITALS — DIASTOLIC BLOOD PRESSURE: 86 MMHG | SYSTOLIC BLOOD PRESSURE: 189 MMHG

## 2022-01-14 VITALS — SYSTOLIC BLOOD PRESSURE: 139 MMHG | DIASTOLIC BLOOD PRESSURE: 65 MMHG

## 2022-01-14 VITALS — SYSTOLIC BLOOD PRESSURE: 173 MMHG | DIASTOLIC BLOOD PRESSURE: 83 MMHG

## 2022-01-14 LAB
ALBUMIN SERPL-MCNC: 3.1 GM/DL (ref 3.2–4.5)
ALP SERPL-CCNC: 83 U/L (ref 40–136)
ALT SERPL-CCNC: 18 U/L (ref 0–55)
BASOPHILS # BLD AUTO: 0.1 10^3/UL (ref 0–0.1)
BASOPHILS NFR BLD AUTO: 1 % (ref 0–10)
BILIRUB SERPL-MCNC: 0.6 MG/DL (ref 0.1–1)
BUN/CREAT SERPL: 13
CALCIUM SERPL-MCNC: 9.1 MG/DL (ref 8.5–10.1)
CHLORIDE SERPL-SCNC: 98 MMOL/L (ref 98–107)
CO2 SERPL-SCNC: 24 MMOL/L (ref 21–32)
CREAT SERPL-MCNC: 1.44 MG/DL (ref 0.6–1.3)
EOSINOPHIL # BLD AUTO: 0.1 10^3/UL (ref 0–0.3)
EOSINOPHIL NFR BLD AUTO: 2 % (ref 0–10)
GFR SERPLBLD BASED ON 1.73 SQ M-ARVRAT: 48 ML/MIN
GLUCOSE SERPL-MCNC: 154 MG/DL (ref 70–105)
HCT VFR BLD CALC: 37 % (ref 40–54)
HGB BLD-MCNC: 12.3 G/DL (ref 13.3–17.7)
LYMPHOCYTES # BLD AUTO: 1.6 10^3/UL (ref 1–4)
LYMPHOCYTES NFR BLD AUTO: 21 % (ref 12–44)
MAGNESIUM SERPL-MCNC: 1.7 MG/DL (ref 1.6–2.4)
MANUAL DIFFERENTIAL PERFORMED BLD QL: NO
MCH RBC QN AUTO: 30 PG (ref 25–34)
MCHC RBC AUTO-ENTMCNC: 33 G/DL (ref 32–36)
MCV RBC AUTO: 90 FL (ref 80–99)
MONOCYTES # BLD AUTO: 0.6 10^3/UL (ref 0–1)
MONOCYTES NFR BLD AUTO: 8 % (ref 0–12)
NEUTROPHILS # BLD AUTO: 5 10^3/UL (ref 1.8–7.8)
NEUTROPHILS NFR BLD AUTO: 65 % (ref 42–75)
PLATELET # BLD: 210 10^3/UL (ref 130–400)
PMV BLD AUTO: 10.5 FL (ref 9–12.2)
POTASSIUM SERPL-SCNC: 4.3 MMOL/L (ref 3.6–5)
PROT SERPL-MCNC: 6.9 GM/DL (ref 6.4–8.2)
SODIUM SERPL-SCNC: 136 MMOL/L (ref 135–145)
WBC # BLD AUTO: 7.6 10^3/UL (ref 4.3–11)

## 2022-01-14 RX ADMIN — MECLIZINE HYDROCHLORIDE SCH MG: 25 TABLET ORAL at 21:00

## 2022-01-14 RX ADMIN — MECLIZINE HYDROCHLORIDE SCH MG: 25 TABLET ORAL at 09:10

## 2022-01-14 RX ADMIN — LEVOTHYROXINE SODIUM SCH MCG: 150 TABLET ORAL at 06:30

## 2022-01-14 RX ADMIN — LACTULOSE SCH GM: 20 SOLUTION ORAL at 13:53

## 2022-01-14 RX ADMIN — INSULIN ASPART SCH UNIT: 100 INJECTION, SOLUTION INTRAVENOUS; SUBCUTANEOUS at 21:00

## 2022-01-14 RX ADMIN — OMEGA-3 FATTY ACIDS CAP 1000 MG SCH MG: 1000 CAP at 09:10

## 2022-01-14 RX ADMIN — ENALAPRIL MALEATE SCH MG: 5 TABLET ORAL at 09:10

## 2022-01-14 RX ADMIN — Medication SCH MCG: at 09:10

## 2022-01-14 RX ADMIN — SENNOSIDES SCH MG: 8.6 TABLET, FILM COATED ORAL at 13:52

## 2022-01-14 RX ADMIN — ENOXAPARIN SODIUM SCH MG: 100 INJECTION SUBCUTANEOUS at 11:14

## 2022-01-14 RX ADMIN — Medication SCH ML: at 06:30

## 2022-01-14 RX ADMIN — LACTULOSE SCH GM: 20 SOLUTION ORAL at 20:59

## 2022-01-14 RX ADMIN — SIMVASTATIN SCH MG: 40 TABLET, FILM COATED ORAL at 09:10

## 2022-01-14 RX ADMIN — PANTOPRAZOLE SODIUM SCH MG: 40 TABLET, DELAYED RELEASE ORAL at 17:57

## 2022-01-14 RX ADMIN — SENNOSIDES SCH MG: 8.6 TABLET, FILM COATED ORAL at 21:00

## 2022-01-14 RX ADMIN — CLOPIDOGREL BISULFATE SCH MG: 75 TABLET, FILM COATED ORAL at 09:10

## 2022-01-14 RX ADMIN — METOPROLOL SUCCINATE SCH MG: 50 TABLET, EXTENDED RELEASE ORAL at 09:10

## 2022-01-14 RX ADMIN — SODIUM CHLORIDE SCH MLS/HR: 900 INJECTION INTRAVENOUS at 03:08

## 2022-01-14 RX ADMIN — IPRATROPIUM BROMIDE AND ALBUTEROL SULFATE SCH ML: .5; 3 SOLUTION RESPIRATORY (INHALATION) at 13:39

## 2022-01-14 RX ADMIN — ASPIRIN SCH MG: 81 TABLET ORAL at 09:10

## 2022-01-14 RX ADMIN — Medication SCH EA: at 06:30

## 2022-01-14 RX ADMIN — Medication SCH ML: at 21:01

## 2022-01-14 RX ADMIN — SODIUM CHLORIDE SCH MLS/HR: 900 INJECTION INTRAVENOUS at 17:57

## 2022-01-14 RX ADMIN — ENALAPRIL MALEATE SCH MG: 5 TABLET ORAL at 21:01

## 2022-01-14 RX ADMIN — AZITHROMYCIN SCH MG: 250 TABLET, FILM COATED ORAL at 09:10

## 2022-01-14 RX ADMIN — IPRATROPIUM BROMIDE AND ALBUTEROL SULFATE SCH ML: .5; 3 SOLUTION RESPIRATORY (INHALATION) at 20:31

## 2022-01-14 RX ADMIN — INSULIN ASPART SCH UNIT: 100 INJECTION, SOLUTION INTRAVENOUS; SUBCUTANEOUS at 17:57

## 2022-01-14 RX ADMIN — INSULIN ASPART SCH UNIT: 100 INJECTION, SOLUTION INTRAVENOUS; SUBCUTANEOUS at 11:13

## 2022-01-14 RX ADMIN — Medication SCH ML: at 14:00

## 2022-01-14 RX ADMIN — IPRATROPIUM BROMIDE AND ALBUTEROL SULFATE SCH ML: .5; 3 SOLUTION RESPIRATORY (INHALATION) at 07:54

## 2022-01-14 RX ADMIN — INSULIN ASPART SCH UNIT: 100 INJECTION, SOLUTION INTRAVENOUS; SUBCUTANEOUS at 06:31

## 2022-01-14 RX ADMIN — SODIUM CHLORIDE SCH MLS/HR: 900 INJECTION INTRAVENOUS at 11:14

## 2022-01-14 RX ADMIN — IPRATROPIUM BROMIDE AND ALBUTEROL SULFATE SCH ML: .5; 3 SOLUTION RESPIRATORY (INHALATION) at 03:03

## 2022-01-14 RX ADMIN — TAMSULOSIN HYDROCHLORIDE SCH MG: 0.4 CAPSULE ORAL at 09:10

## 2022-01-14 RX ADMIN — SPIRONOLACTONE SCH MG: 25 TABLET ORAL at 09:10

## 2022-01-14 RX ADMIN — AMLODIPINE BESYLATE SCH MG: 5 TABLET ORAL at 09:10

## 2022-01-14 RX ADMIN — MECLIZINE HYDROCHLORIDE SCH MG: 25 TABLET ORAL at 13:52

## 2022-01-14 NOTE — PROGRESS NOTE - HOSPITALIST
RADHA COPPOLA [facility] 1/14/22 1241:


Subjective


HPI/CC On Admission


Date Seen by Provider:  Jan 14, 2022


Time Seen by Provider:  09:00


Chief Complaint: Altered mental status





HPI: This is an 86yoWM with a past medical history of chronic lower extremity 

edema and just overall declined status and chronic debility who presented to the

ER due to UTI with altered mental status and hypoxia with elevated Troponin. 

Cardiology has been consulted. We will transfer him down to 4th floor and 

maintain Telemetry and we will have social work review what options are for Home

Health Care. He reports he walks around with a walker and a wheelchair. He 

appears to be chronically ill.


Subjective/Events-last exam


Patient remains afebrile


Patient's family at bedside during encounter


Reports not having BM. 


Was seen by palliative care


WBC at 7.6


Cardiology continues to change medication to control blood pressure


Nasal cannula on 2L





Review of Systems


General:  No Chills, No Other (fevers)


Pulmonary:  No Dyspnea, No Cough; Other (requiring 2L of O2)


Cardiovascular:  No: Chest Pain, Palpitations


Gastrointestinal:  Constipation; No: Nausea, Vomiting





Objective


Exam


Vital Signs





Vital Signs








  Date Time  Temp Pulse Resp B/P (MAP) Pulse Ox O2 Delivery O2 Flow Rate FiO2


 


1/14/22 11:28 36.4 74 22 146/71 (96) 99 Nasal Cannula 2.00 


 


1/12/22 06:49        32





Capillary Refill : Less Than 3 Seconds


General Appearance:  No Apparent Distress, Chronically ill


HEENT:  PERRL/EOMI, Normal ENT Inspection (No gross deformities)


Neck:  Non Tender, Supple


Respiratory:  Chest Non Tender, Lungs Clear, No Accessory Muscle Use, No 

Respiratory Distress, Other (Requiring Oxygen, 2L)


Cardiovascular:  Regular Rate, Rhythm, No JVD, Normal Peripheral Pulses, Other 

(LE edeam. ACE wraps applied to LE)


Gastrointestinal:  Non Tender, Soft, Distended


Extremity:  Non Tender, No Calf Tenderness, Other (ACE wraps applied to LE)


Neurologic/Psychiatric:  Alert, Oriented x3, Normal Mood/Affect





Results/Procedures


Lab


Laboratory Tests


1/14/22 05:20








Patient resulted labs reviewed.





Assessment/Plan


Assessment and Plan


Assess & Plan/Chief Complaint


Assessment:


Left lower lobe PNA


Sepsis


Fall


UTI


AMS


DM


TIA


Chronic lower extremity edema


Chronic debility


HTN


CKD


Elevated troponin





Plan:


Begin Bowel regimen


Continue Oxygen as needed


IV abx and PO abx


Monitor closely


Cardiology consult


Home meds


PT OT





1/12/22:


PT OT


Abx


Monitor closely


Needs DNR discussion





1/13/22:


Needs NHP


Poor prognosis


Needs DNR





1/14/22


Begin Bowel Regimen


Palliative care discussion addressing code status





JOANNA MARIA DO 1/15/22 0522:


Subjective


Subjective/Events-last exam


Patient doing fairly well


No BM yet


Family at the bedside


Family used subtle intimidation to address issues that cannot be modified


Unrealistic expectations noted from family


Tried to answer all questions the best I could


Palliative care nurse at the bedside


Review of Systems


General:  Fatigue, Malaise


Gastrointestinal:  Constipation





Objective


Exam


General Appearance:  No Apparent Distress, WD/WN, Chronically ill


Respiratory:  No Accessory Muscle Use, No Respiratory Distress, Decreased Breath

Sounds


Cardiovascular:  Regular Rate, Rhythm


Neurologic/Psychiatric:  Alert, Oriented x3, Disoriented





Assessment/Plan


Assessment and Plan


Assess & Plan/Chief Complaint


Patient appears to be the best he can be


Advanced age and comorbidities preclude anything but a very poor prognosis


Patient is hospice candidate in my opinion


Unrealistic family expectations





Supervisory-Addendum Brief


Verification & Attestation


Participated in pt care:  history, MDM, physical


Personally performed:  exam, history, MDM, supervision of care


Care discussed with:  Medical Student


Procedures:  n/a


Results interpretation:  Verified all documentation


Verification and Attestation of Medical Student E/M Service





A medical student performed and documented this service in my presence. I 

reviewed and verified all information documented by the medical student and made

modifications to such information, when appropriate. I personally performed the 

physical exam and medical decision making. 





 Joanna Maria, Curt 15, 2022,05:17











RADHA COPPOLA [facility]    Jan 14, 2022 12:41


JOANNA MARIA DO                Curt 15, 2022 05:22

## 2022-01-14 NOTE — PROGRESS NOTE - CARDIOLOGY
Cardiology SOAP Progress Note


Subjective:


Gen malaise and weakness present


No cp or palp or syncope 


No shortness of breath at rest


No n/v/d





Objective:


I&O/Vital Signs











 1/14/22 1/14/22 1/14/22 1/14/22





 04:16 07:29 07:55 08:00


 


Temp 36.6 36.5  


 


Pulse 78 78  


 


Resp 18 22  


 


B/P (MAP) 169/90 (116) 189/86 (120)  


 


Pulse Ox 90 99 95 


 


O2 Delivery Nasal Cannula Nasal Cannula Nasal Cannula Nasal Cannula


 


O2 Flow Rate 2.00 2.00 2.00 2.00


 


    





 1/14/22 1/14/22  





 11:28 13:40  


 


Temp 36.4   


 


Pulse 74   


 


Resp 22   


 


B/P (MAP) 146/71 (96)   


 


Pulse Ox 99 95  


 


O2 Delivery Nasal Cannula Nasal Cannula  


 


O2 Flow Rate 2.00 2.00  














 1/14/22





 00:00


 


Intake Total 1120 ml


 


Output Total 1225 ml


 


Balance -105 ml








Weight (Pounds):  241


Weight (Ounces):  0.0


Weight (Calculated Kilograms):  109.359510


Constitutional:  No AAO x 3 (Does not respond to questions; opens eyes); well-

developed, well-nourished, other ([place])


Respiratory:  No accessory muscle use, No respiratory distress; chest expansion 

is symmetric, chest is bilaterally symmetric, other (fair air entry)


Cardiovascular:  regular rate-rhythm; No JVD; S1 and S2


Gastrointestional:  No tender; soft, round, audible bowel sounds


Extremities:  swelling (mod leg swelling with wraps in place); No clubbing, No 

cyanosis


Neurologic/Psychiatric:  other (moves all limbs equally)


Skin:  No rash on exposed areas, No ulcerations on exposed areas





Results/Procedures:


Labs


Laboratory Tests


1/13/22 16:13: Glucometer 68L


1/13/22 17:08: Glucometer 93


1/13/22 20:13: Glucometer 155H


1/14/22 05:19: Glucometer 146H


1/14/22 05:20: 


White Blood Count 7.6, Red Blood Count 4.12L, Hemoglobin 12.3L, Hematocrit 37L, 

Mean Corpuscular Volume 90, Mean Corpuscular Hemoglobin 30, Mean Corpuscular 

Hemoglobin Concent 33, Red Cell Distribution Width 12.2, Platelet Count 210, 

Mean Platelet Volume 10.5, Immature Granulocyte % (Auto) 4, Neutrophils (%) 

(Auto) 65, Lymphocytes (%) (Auto) 21, Monocytes (%) (Auto) 8, Eosinophils (%) 

(Auto) 2, Basophils (%) (Auto) 1, Neutrophils # (Auto) 5.0, Lymphocytes # (Auto)

1.6, Monocytes # (Auto) 0.6, Eosinophils # (Auto) 0.1, Basophils # (Auto) 0.1, 

Immature Granulocyte # (Auto) 0.3H, Sodium Level 136, Potassium Level 4.3, 

Chloride Level 98, Carbon Dioxide Level 24, Anion Gap 14, Blood Urea Nitrogen 

19H, Creatinine 1.44H, Estimat Glomerular Filtration Rate 48, BUN/Creatinine 

Ratio 13, Glucose Level 154H, Calcium Level 9.1, Corrected Calcium 9.8, 

Magnesium Level 1.7, Total Bilirubin 0.6, Aspartate Amino Transf (AST/SGOT) 30, 

Alanine Aminotransferase (ALT/SGPT) 18, Alkaline Phosphatase 83, Total Protein 

6.9, Albumin 3.1L


1/14/22 10:49: Glucometer 252H


1/14/22 15:20: 





Microbiology


1/10/22 MRSA Screen - Final, Complete


          MRSA not isolated


1/10/22 Blood Culture - Preliminary, Resulted


          No growth


1/10/22 Urine Culture - Final, Complete


          Staphylococcus epidermidis





Laboratory Tests


1/13/22 05:55








1/14/22 05:20











A/P:


Assessment:


RLL pneumonia


- management per medical services





Mild elevation in troponin level likely Type 2 MI secondary to hypoxia





Confusion


- likely secondary to hypoxia/sepsis - improving





Chronic dyspnea, history of obesity hypoventilation syndrome.  Moderate 

obstructive and restrictive lung defect on PFT in 2017.





History of nonischemic cardiomyopathy 


- Echo done by Dr. Hinds in March 2021 reported ejection fraction 45 to 50% 

with grade 1 diastolic dysfunction, moderate left atrial dilatation, PA pressure

35 to 40 mmHg


- Last stress test was done in March 2021 showing no evidence of ischemia or 

infarction, with ejection fraction 52%





History of CVA 


- in the remote past with transient slurred speech





Carotid dz


- Mild bilateral carotid stenosis, ultrasound was done in March 2021





Hypertension


- uncontrolled





Hyperlipidemia


- statin tx





Diabetes mellitus


- poorly controlled, managed by primary care physician





History of bilateral lower extremities edema 


- secondary to chronic venous insufficiency





History of chronic kidney disease stage III


- Followed by nephrologist as out pt





History of baseline incomplete left bundle branch block.





History of obstructive sleep apnea


- diagnosed in 2017


- non-compliant with tx





Hypothyroidism


- followed and managed by primary care physician





Gastroesophageal reflux disease





Depression


Plan:





Complex management due to multiple comorbidities


RLL pneumonia - management per medical services


BP not well controlled - adjust antihypertensive regimen as indicated


Monitor lab closely


Replace electrolytes as indicated











ELIAS HINSD MD FACP FAC CCDS   Jan 14, 2022 15:26

## 2022-01-14 NOTE — PHYSICAL THERAPY DAILY NOTE
PT Daily Note-Current


Subjective


Patient is sitting in chair and agrees to get up and walk with PT.





Mental Status


Patient Orientation:  Person, Confused


Attachments:  Oxygen, [place] Catheter





Transfers


SCALE: Activities may be completed with or without assistive devices.





6-Indepedent-patient completes the activity by him/herself with no assistance 

from a helper.


5-Set-up or Clean-up Assistance-helper sets up or cleans up; patient completes 

activity. [place] assists only prior to or  


    following the activity.


4-Supervision or Touching Assistance-helper provides verbal cues and/or 

touching/steadying and/or contact guard assistance as patient completes 

activity. Assistance may be provided   


    throughout the activity or intermittently.


3-Partial/Moderate Assistance-helper does LESS THAN HALF the effort. [place] 

lifts, holds or supports trunk or limbs, but provides less than half the effort.


2-Substantial/Maximal Assistance-helper does MORE THAN HALF the effort. [place] 

lifts or holds trunk or limbs and provides more than half the effort.


1-[id number]-[id number] does ALL the effort. Patient does none of the effort to 

complete the activity. Or, the assistance of 2 or more helpers is required for 

the patient to complete the  


    activity.


If activity was not attempted, code reason:


7-Patient Refused.


9-Not Applicable-not attempted and the patient did not perform the activity 

before the current illness, exacerbation or injury.


10-Not Attempted due to Environmental Limitations-(lack of equipment, weather 

restraints, etc.).


88-Not Attempted due to Medical Conditions or Safety Concerns.


Sit to Stand (QC):  2 (max)


Patient requires max assist for sit to stand transfer. Patient family reports 

that he has a lift chair at home that he uses to get up. Patient is very 

retropulsive when coming to a stand but once he gets his balance he is CGA for 

ambulation.





Gait Training


Distance:  150'


Walk 10 feet (QC):  4


Walk 50 ft with 2 Turns(QC):  4


Walk 150 ft (QC):  4


Gait Assistive Device:  FWW


Patient is CGA for ambulation with FWW. Patient fatigues quickly with ambulation

but continues to increase distance with ambulation.





Assessment


Patient requires additional time to sit up and chair and is max assist while 

coming to a a stand due to retropulsive behavior. Patient ambulation is CGA with

FWW but fatigues quickly with ambulation. Patient has a lift chair at home that 

he uses to stand up.





PT Long Term Goals


Long Term Goals


PT Long Term Goals Time Frame:  Jan 18, 2022


Roll Left & Right (QC):  6


Sit to Lying (QC):  6


Lying-Sitting on Side/Bed(QC):  6


Sit to Stand (QC):  4


Chair/Bed-to-Chair Xfer(QC):  4


Walk 10 feet (QC):  4


Walk 50ft with 2 Turns (QC):  4





PT Plan


Treatment/Plan


Treatment Plan:  Continue Plan of Care


Treatment Plan:  Bed Mobility, Education, Functional Activity [place], Functional 

Strength, Gait, Safety, Therapeutic Exercise, Transfers


Treatment Duration:  Jan 18, 2022


Frequency:  6 times per week


Estimated Hrs Per Day:  .25 hour per day


Patient and/or Family Agrees t:  Yes





Time/GCodes


Time In:  915


Time Out:  933


Total Billed Treatment Time:  18


Total Billed Treatment


1 Visit


Gait 18 min











ALAINA MADRID PT              Jan 14, 2022 09:46

## 2022-01-14 NOTE — PROGRESS NOTE - CARDIOLOGY
Cardiology SOAP Progress Note


Subjective:


More alert today


No c/o CP


Gen weakness





Objective:


I&O/Vital Signs











 1/13/22 1/13/22 1/14/22 1/14/22





 22:00 22:15 00:50 03:04


 


Temp   36.6 


 


Pulse   70 


 


Resp   18 


 


B/P (MAP)   173/83 (113) 


 


Pulse Ox  96 97 96


 


O2 Delivery Nasal Cannula Nasal Cannula Nasal Cannula Nasal Cannula


 


O2 Flow Rate 1.00 2.00 2.00 2.00


 


    





 1/14/22 1/14/22 1/14/22 





 04:16 07:29 07:55 


 


Temp 36.6 36.5  


 


Pulse 78 78  


 


Resp 18 22  


 


B/P (MAP) 169/90 (116) 189/86 (120)  


 


Pulse Ox 90 99 95 


 


O2 Delivery Nasal Cannula Nasal Cannula Nasal Cannula 


 


O2 Flow Rate 2.00 2.00 2.00 














 1/14/22





 00:00


 


Intake Total 1120 ml


 


Output Total 1225 ml


 


Balance -105 ml








Weight (Pounds):  241


Weight (Ounces):  0.0


Weight (Calculated Kilograms):  109.441759


Constitutional:  No AAO x 3 (Does not respond to questions; opens eyes); well-d

eveloped, well-nourished, other ([place])


Respiratory:  No accessory muscle use, No respiratory distress; chest expansion 

is symmetric, chest is bilaterally symmetric, other (fair air entry)


Cardiovascular:  regular rate-rhythm; No JVD; S1 and S2


Gastrointestional:  No tender; soft, round, audible bowel sounds


Extremities:  swelling (mod leg swelling with wraps in place); No clubbing, No 

cyanosis


Neurologic/Psychiatric:  other (moves all limbs equally)


Skin:  No rash on exposed areas, No ulcerations on exposed areas





Results/Procedures:


Labs


Laboratory Tests


1/13/22 11:16: Glucometer 186H


1/13/22 16:13: Glucometer 68L


1/13/22 17:08: Glucometer 93


1/13/22 20:13: Glucometer 155H


1/14/22 05:19: Glucometer 146H


1/14/22 05:20: 


White Blood Count 7.6, Red Blood Count 4.12L, Hemoglobin 12.3L, Hematocrit 37L, 

Mean Corpuscular Volume 90, Mean Corpuscular Hemoglobin 30, Mean Corpuscular 

Hemoglobin Concent 33, Red Cell Distribution Width 12.2, Platelet Count 210, 

Mean Platelet Volume 10.5, Immature Granulocyte % (Auto) 4, Neutrophils (%) 

(Auto) 65, Lymphocytes (%) (Auto) 21, Monocytes (%) (Auto) 8, Eosinophils (%) 

(Auto) 2, Basophils (%) (Auto) 1, Neutrophils # (Auto) 5.0, Lymphocytes # (Auto)

1.6, Monocytes # (Auto) 0.6, Eosinophils # (Auto) 0.1, Basophils # (Auto) 0.1, 

Immature Granulocyte # (Auto) 0.3H, Sodium Level 136, Potassium Level 4.3, 

Chloride Level 98, Carbon Dioxide Level 24, Anion Gap 14, Blood Urea Nitrogen 

19H, Creatinine 1.44H, Estimat Glomerular Filtration Rate 48, BUN/Creatinine 

Ratio 13, Glucose Level 154H, Calcium Level 9.1, Corrected Calcium 9.8, 

Magnesium Level 1.7, Total Bilirubin 0.6, Aspartate Amino Transf (AST/SGOT) 30, 

Alanine Aminotransferase (ALT/SGPT) 18, Alkaline Phosphatase 83, Total Protein 

6.9, Albumin 3.1L





Microbiology


1/10/22 MRSA Screen - Final, Complete


          MRSA not isolated


1/10/22 Blood Culture - Preliminary, Resulted


          No growth


1/10/22 Urine Culture - Final, Complete


          Staphylococcus epidermidis








A/P:


Assessment:


RLL pneumonia


- management per medical services





Mild elevation in troponin level likely Type 2 MI secondary to hypoxia





Confusion


- likely secondary to hypoxia/sepsis - improving





Chronic dyspnea, history of obesity hypoventilation syndrome.  Moderate 

obstructive and restrictive lung defect on PFT in 2017.





History of nonischemic cardiomyopathy 


- Echo done by Dr. Hinds in March 2021 reported ejection fraction 45 to 50% 

with grade 1 diastolic dysfunction, moderate left atrial dilatation, PA pressure

35 to 40 mmHg


- Last stress test was done in March 2021 showing no evidence of ischemia or 

infarction, with ejection fraction 52%





History of CVA 


- in the remote past with transient slurred speech





Carotid dz


- Mild bilateral carotid stenosis, ultrasound was done in March 2021





Hypertension


- uncontrolled





Hyperlipidemia


- statin tx





Diabetes mellitus


- poorly controlled, managed by primary care physician





History of bilateral lower extremities edema 


- secondary to chronic venous insufficiency





History of chronic kidney disease stage III


- Followed by nephrologist as out pt





History of baseline incomplete left bundle branch block.





History of obstructive sleep apnea


- diagnosed in 2017


- non-compliant with tx





Hypothyroidism


- followed and managed by primary care physician





Gastroesophageal reflux disease





Depression


Plan:





Complex management due to multiple comorbidities


RLL pneumonia - management per medical services


BP not well controlled - adjust antihypertensive regimen as indicated


Monitor lab closely


Replace electrolytes as indicated











JADE MARIN           Jan 14, 2022 09:58

## 2022-01-14 NOTE — OCCUPATIONAL THER DAILY NOTE
OT Current Status-Daily Note


Subjective


Pt denies pain, seemed to comprehend slightly better today.





Appearance


Left sitting in chair, all needs within reach, family in room.





Mental Status/Objective


Patient Orientation:  Person, Confused


Attachments:  IV, Oxygen





ADL-Treatment


Therapy Code Descriptions/Definitions 





Functional [place] Measure:


0=Not Assessed/NA        4=Minimal Assistance


1=Total Assistance        5=Supervision or Setup


2=Maximal Assistance  6=Modified [place]


3=Moderate Assistance 7=Complete IndependenceSCALE: Activities may be completed 

with or without assistive devices.





6-Indepedent-patient completes the activity by him/herself with no assistance 

from a helper.


5-Set-up or Clean-up Assistance-helper sets up or cleans up; patient completes 

activity. [place] assists only prior to or  


    following the activity.


4-Supervision or Touching Assistance-helper provides verbal cues and/or 

touching/steadying and/or contact guard assistance as patient completes 

activity. Assistance may be provided   


    throughout the activity or intermittently.


3-Partial/Moderate Assistance-helper does LESS THAN HALF the effort. [place] 

lifts, holds or supports trunk or limbs, but provides less than half the effort.


2-Substantial/Maximal Assistance-helper does MORE THAN HALF the effort. [place] 

lifts or holds trunk or limbs and provides more than half the effort.


4-Pgkjokkby-uinbsn does ALL the effort. Patient does none of the effort to 

complete the activity. Or, the assistance of 2 or more helpers is required for 

the patient to complete the  


    activity.


If activity was not attempted, code reason:


7-Patient Refused.


9-Not Applicable-not attempted and the patient did not perform the activity 

before the current illness, exacerbation or injury.


10-Not Attempted due to Environmental Limitations-(lack of equipment, weather 

restraints, etc.).


88-Not Attempted due to Medical Conditions or Safety Concerns.





Other Treatment


Pt participated in UE exercises with goal to promote increased strength and 

endurance needed for adls and transfers. Verbal and visual cues required for 

proper technique. Cues also needed for slow controlled movement. Isometric 

exercises x5 performed at shoulder and elbow for muscle strengthening. Pt 

appears to tolerate well. Currently on 2L O2. 10x1 in all planes.





Education


OT Patient Education:  Correct positioning, Exercise program, Home exercise 

program, Progress toward Goal/Update tx plan, Purpose of tx/functional 

activities


Teaching Recipient:  Patient, Family


Teaching Methods:  Demonstration, Discussion


Response to Teaching:  Return Demonstration, Reinforcement Needed





OT Long Term Goals


Long Term Goals


Time Frame:  Jan 28, 2022


Oral Hygiene (QC):  4


Toileting Hygiene (QC):  3


Shower/Bathe Self (QC):  2


Upper Body Dressing (QC):  3


Lower Body Dressing (QC):  3


1=Demonstrate adherence to instructed precautions during ADL tasks.


2=Patient will verbalize/demonstrate understanding of assistive 

devices/modifications for ADL.


3=Patient will improve strength/tolerance for activity to enable patient to 

perform ADL's.





OT Education/Plan


Problem List/Assessment


Assessment:  Decreased Activ Tolerance, Decreased Safety Aware, Decreased UE 

Strength, Impaired Cognition, Impaired Funct Balance, Impaired Self-Care Skills





Discharge Recommendations


Plan/Recommendations:  Continue POC





Treatment Plan/Plan of Care


Treatment,Training & Education:  Yes


Patient would benefit from OT for education, treatment and training to promote 

independence in ADL's, mobility, safety and/or upper extremity function for 

ADL's.


Plan of Care:  ADL Retraining, Caregiver Training, Functional Mobility, UE Funct

Exercise/Act


Treatment Duration:  Jan 25, 2022


Frequency:  3 times per week


Estimated Hrs Per Day:  .25 hour per day


Agreement:  Yes


Rehab Potential:  Poor





Time/GCodes


Start Time:  09:32


Stop Time:  09:43


Total Time Billed (hr/min):  11


Billed Treatment Time


1 visit


EX











Salma Poe OT                Jan 14, 2022 10:08

## 2022-01-15 VITALS — DIASTOLIC BLOOD PRESSURE: 64 MMHG | SYSTOLIC BLOOD PRESSURE: 137 MMHG

## 2022-01-15 VITALS — SYSTOLIC BLOOD PRESSURE: 140 MMHG | DIASTOLIC BLOOD PRESSURE: 66 MMHG

## 2022-01-15 VITALS — DIASTOLIC BLOOD PRESSURE: 75 MMHG | SYSTOLIC BLOOD PRESSURE: 139 MMHG

## 2022-01-15 VITALS — SYSTOLIC BLOOD PRESSURE: 125 MMHG | DIASTOLIC BLOOD PRESSURE: 60 MMHG

## 2022-01-15 VITALS — DIASTOLIC BLOOD PRESSURE: 76 MMHG | SYSTOLIC BLOOD PRESSURE: 146 MMHG

## 2022-01-15 LAB
ALBUMIN SERPL-MCNC: 3.3 GM/DL (ref 3.2–4.5)
ALP SERPL-CCNC: 73 U/L (ref 40–136)
ALT SERPL-CCNC: 15 U/L (ref 0–55)
BASOPHILS # BLD AUTO: 0.1 10^3/UL (ref 0–0.1)
BASOPHILS NFR BLD AUTO: 1 % (ref 0–10)
BILIRUB SERPL-MCNC: 0.6 MG/DL (ref 0.1–1)
BUN/CREAT SERPL: 13
CALCIUM SERPL-MCNC: 9.2 MG/DL (ref 8.5–10.1)
CHLORIDE SERPL-SCNC: 95 MMOL/L (ref 98–107)
CO2 SERPL-SCNC: 28 MMOL/L (ref 21–32)
CREAT SERPL-MCNC: 1.39 MG/DL (ref 0.6–1.3)
EOSINOPHIL # BLD AUTO: 0.2 10^3/UL (ref 0–0.3)
EOSINOPHIL NFR BLD AUTO: 2 % (ref 0–10)
GFR SERPLBLD BASED ON 1.73 SQ M-ARVRAT: 50 ML/MIN
GLUCOSE SERPL-MCNC: 163 MG/DL (ref 70–105)
HCT VFR BLD CALC: 37 % (ref 40–54)
HGB BLD-MCNC: 12.4 G/DL (ref 13.3–17.7)
LYMPHOCYTES # BLD AUTO: 1.5 10^3/UL (ref 1–4)
LYMPHOCYTES NFR BLD AUTO: 18 % (ref 12–44)
MAGNESIUM SERPL-MCNC: 1.8 MG/DL (ref 1.6–2.4)
MANUAL DIFFERENTIAL PERFORMED BLD QL: NO
MCH RBC QN AUTO: 30 PG (ref 25–34)
MCHC RBC AUTO-ENTMCNC: 34 G/DL (ref 32–36)
MCV RBC AUTO: 89 FL (ref 80–99)
MONOCYTES # BLD AUTO: 0.6 10^3/UL (ref 0–1)
MONOCYTES NFR BLD AUTO: 7 % (ref 0–12)
NEUTROPHILS # BLD AUTO: 6.1 10^3/UL (ref 1.8–7.8)
NEUTROPHILS NFR BLD AUTO: 70 % (ref 42–75)
PLATELET # BLD: 239 10^3/UL (ref 130–400)
PMV BLD AUTO: 10 FL (ref 9–12.2)
POTASSIUM SERPL-SCNC: 4 MMOL/L (ref 3.6–5)
PROT SERPL-MCNC: 7.4 GM/DL (ref 6.4–8.2)
SODIUM SERPL-SCNC: 135 MMOL/L (ref 135–145)
WBC # BLD AUTO: 8.6 10^3/UL (ref 4.3–11)

## 2022-01-15 RX ADMIN — IPRATROPIUM BROMIDE AND ALBUTEROL SULFATE SCH ML: .5; 3 SOLUTION RESPIRATORY (INHALATION) at 14:22

## 2022-01-15 RX ADMIN — SIMVASTATIN SCH MG: 40 TABLET, FILM COATED ORAL at 09:56

## 2022-01-15 RX ADMIN — ENOXAPARIN SODIUM SCH MG: 100 INJECTION SUBCUTANEOUS at 12:32

## 2022-01-15 RX ADMIN — ASPIRIN SCH MG: 81 TABLET ORAL at 09:57

## 2022-01-15 RX ADMIN — AMLODIPINE BESYLATE SCH MG: 5 TABLET ORAL at 09:56

## 2022-01-15 RX ADMIN — MECLIZINE HYDROCHLORIDE SCH MG: 25 TABLET ORAL at 19:53

## 2022-01-15 RX ADMIN — PANTOPRAZOLE SODIUM SCH MG: 40 TABLET, DELAYED RELEASE ORAL at 17:31

## 2022-01-15 RX ADMIN — INSULIN ASPART SCH UNIT: 100 INJECTION, SOLUTION INTRAVENOUS; SUBCUTANEOUS at 20:14

## 2022-01-15 RX ADMIN — MECLIZINE HYDROCHLORIDE SCH MG: 25 TABLET ORAL at 12:32

## 2022-01-15 RX ADMIN — SPIRONOLACTONE SCH MG: 25 TABLET ORAL at 09:57

## 2022-01-15 RX ADMIN — SENNOSIDES SCH MG: 8.6 TABLET, FILM COATED ORAL at 12:31

## 2022-01-15 RX ADMIN — SODIUM CHLORIDE SCH MLS/HR: 900 INJECTION INTRAVENOUS at 03:50

## 2022-01-15 RX ADMIN — ENALAPRIL MALEATE SCH MG: 5 TABLET ORAL at 19:54

## 2022-01-15 RX ADMIN — LACTULOSE SCH GM: 20 SOLUTION ORAL at 09:57

## 2022-01-15 RX ADMIN — SODIUM CHLORIDE SCH MLS/HR: 900 INJECTION INTRAVENOUS at 18:31

## 2022-01-15 RX ADMIN — INSULIN ASPART SCH UNIT: 100 INJECTION, SOLUTION INTRAVENOUS; SUBCUTANEOUS at 16:37

## 2022-01-15 RX ADMIN — Medication SCH ML: at 19:54

## 2022-01-15 RX ADMIN — IPRATROPIUM BROMIDE AND ALBUTEROL SULFATE SCH ML: .5; 3 SOLUTION RESPIRATORY (INHALATION) at 07:57

## 2022-01-15 RX ADMIN — Medication SCH ML: at 13:49

## 2022-01-15 RX ADMIN — INSULIN ASPART SCH UNIT: 100 INJECTION, SOLUTION INTRAVENOUS; SUBCUTANEOUS at 11:50

## 2022-01-15 RX ADMIN — TAMSULOSIN HYDROCHLORIDE SCH MG: 0.4 CAPSULE ORAL at 09:57

## 2022-01-15 RX ADMIN — ENALAPRIL MALEATE SCH MG: 5 TABLET ORAL at 09:57

## 2022-01-15 RX ADMIN — OMEGA-3 FATTY ACIDS CAP 1000 MG SCH MG: 1000 CAP at 09:57

## 2022-01-15 RX ADMIN — MECLIZINE HYDROCHLORIDE SCH MG: 25 TABLET ORAL at 09:57

## 2022-01-15 RX ADMIN — METOPROLOL SUCCINATE SCH MG: 50 TABLET, EXTENDED RELEASE ORAL at 09:56

## 2022-01-15 RX ADMIN — SODIUM CHLORIDE SCH MLS/HR: 900 INJECTION INTRAVENOUS at 11:57

## 2022-01-15 RX ADMIN — Medication SCH EA: at 06:31

## 2022-01-15 RX ADMIN — LACTULOSE SCH GM: 20 SOLUTION ORAL at 19:53

## 2022-01-15 RX ADMIN — Medication SCH MCG: at 09:56

## 2022-01-15 RX ADMIN — LEVOTHYROXINE SODIUM SCH MCG: 150 TABLET ORAL at 06:31

## 2022-01-15 RX ADMIN — INSULIN ASPART SCH UNIT: 100 INJECTION, SOLUTION INTRAVENOUS; SUBCUTANEOUS at 06:10

## 2022-01-15 RX ADMIN — SENNOSIDES SCH MG: 8.6 TABLET, FILM COATED ORAL at 09:56

## 2022-01-15 RX ADMIN — CLOPIDOGREL BISULFATE SCH MG: 75 TABLET, FILM COATED ORAL at 09:56

## 2022-01-15 RX ADMIN — IPRATROPIUM BROMIDE AND ALBUTEROL SULFATE SCH ML: .5; 3 SOLUTION RESPIRATORY (INHALATION) at 20:40

## 2022-01-15 RX ADMIN — SENNOSIDES SCH MG: 8.6 TABLET, FILM COATED ORAL at 19:54

## 2022-01-15 RX ADMIN — AZITHROMYCIN SCH MG: 250 TABLET, FILM COATED ORAL at 09:57

## 2022-01-15 RX ADMIN — IPRATROPIUM BROMIDE AND ALBUTEROL SULFATE SCH ML: .5; 3 SOLUTION RESPIRATORY (INHALATION) at 02:38

## 2022-01-15 RX ADMIN — Medication SCH ML: at 06:31

## 2022-01-15 NOTE — PHYSICAL THERAPY DAILY NOTE
PT Daily Note-Current


Subjective


Pt agreeable.  Reports he can't tell if he is getting any better.





Transfers


SCALE: Activities may be completed with or without assistive devices.





6-Indepedent-patient completes the activity by him/herself with no assistance 

from a helper.


5-Set-up or Clean-up Assistance-helper sets up or cleans up; patient completes 

activity. [place] assists only prior to or  


    following the activity.


4-Supervision or Touching Assistance-helper provides verbal cues and/or 

touching/steadying and/or contact guard assistance as patient completes acti

vity. Assistance may be provided   


    throughout the activity or intermittently.


3-Partial/Moderate Assistance-helper does LESS THAN HALF the effort. [place] 

lifts, holds or supports trunk or limbs, but provides less than half the effort.


2-Substantial/Maximal Assistance-helper does MORE THAN HALF the effort. [place] 

lifts or holds trunk or limbs and provides more than half the effort.


2-Ykhbspetr-iizvzi does ALL the effort. Patient does none of the effort to 

complete the activity. Or, the assistance of 2 or more helpers is required for 

the patient to complete the  


    activity.


If activity was not attempted, code reason:


7-Patient Refused.


9-Not Applicable-not attempted and the patient did not perform the activity 

before the current illness, exacerbation or injury.


10-Not Attempted due to Environmental Limitations-(lack of equipment, weather 

restraints, etc.).


88-Not Attempted due to Medical Conditions or Safety Concerns.





Gait Training


Gait Assistive Device:  FWW


Ambulate 150ft with FWW and CGA with supplemental O2.   Pt request to return to 

room due to fatigue.





PT Long Term Goals


Long Term Goals


PT Long Term Goals Time Frame:  Jan 18, 2022


Roll Left & Right (QC):  6


Sit to Lying (QC):  6


Lying-Sitting on Side/Bed(QC):  6


Sit to Stand (QC):  4


Chair/Bed-to-Chair Xfer(QC):  4


Walk 10 feet (QC):  4


Walk 50ft with 2 Turns (QC):  4





PT Plan


Treatment/Plan


Treatment Plan:  Continue Plan of Care


Treatment Plan:  Bed Mobility, Education, Functional Activity [place], Functional 

Strength, Gait, Safety, Therapeutic Exercise, Transfers


Treatment Duration:  Jan 18, 2022


Frequency:  6 times per week


Estimated Hrs Per Day:  .25 hour per day


Patient and/or Family Agrees t:  Yes





Time/GCodes


Time In:  0830


Time Out:  0850


Total Billed Treatment Time:  20


Total Billed Treatment


visit, gait 20 min











BRIAN HARDIN PT                Curt 15, 2022 12:53

## 2022-01-15 NOTE — PROGRESS NOTE - CARDIOLOGY
Cardiology SOAP Progress Note


Subjective:


Gen weakness and malaise present but improving


No cp or palp or syncope


Shortness of breath with activity


No n/v/d





Objective:


I&O/Vital Signs











 1/15/22 1/15/22 1/15/22 1/15/22





 03:51 07:49 07:59 08:20


 


Temp 36.5 36.8  


 


Pulse 77 79  


 


Resp 20 20  


 


B/P (MAP) 146/76 (99) 139/75 (96)  


 


Pulse Ox 97 97 97 97


 


O2 Delivery Nasal Cannula Nasal Cannula Nasal Cannula Nasal Cannula


 


O2 Flow Rate 2.00 2.00 2.00 2.00


 


    





 1/15/22 1/15/22  





 11:02 14:28  


 


Temp 37.0 37.0  


 


Pulse 74 71  


 


Resp 20   


 


B/P (MAP) 140/66 (90)   


 


Pulse Ox 99 97  


 


O2 Delivery Nasal Cannula   


 


O2 Flow Rate 2.00   














 1/15/22





 00:00


 


Intake Total 1560 ml


 


Output Total 1650 ml


 


Balance -90 ml








Weight (Pounds):  241


Weight (Ounces):  0.0


Weight (Calculated Kilograms):  109.496909


Constitutional:  No AAO x 3 (Does not respond to questions; opens eyes); well-

developed, well-nourished, other ([place])


Respiratory:  No accessory muscle use, No respiratory distress; chest expansion 

is symmetric, chest is bilaterally symmetric, other (fair air entry)


Cardiovascular:  regular rate-rhythm; No JVD; S1 and S2


Gastrointestional:  No tender; soft, round, audible bowel sounds


Extremities:  swelling (mod leg swelling with wraps in place); No clubbing, No 

cyanosis


Neurologic/Psychiatric:  other (moves all limbs equally)


Skin:  No rash on exposed areas, No ulcerations on exposed areas





Results/Procedures:


Labs


Laboratory Tests


1/14/22 15:20: Glucometer 301H


1/14/22 20:08: Glucometer 252H


1/15/22 06:09: Glucometer 149H


1/15/22 07:40: 


White Blood Count 8.6, Red Blood Count 4.15L, Hemoglobin 12.4L, Hematocrit 37L, 

Mean Corpuscular Volume 89, Mean Corpuscular Hemoglobin 30, Mean Corpuscular 

Hemoglobin Concent 34, Red Cell Distribution Width 12.1, Platelet Count 239, 

Mean Platelet Volume 10.0, Immature Granulocyte % (Auto) 3, Neutrophils (%) 

(Auto) 70, Lymphocytes (%) (Auto) 18, Monocytes (%) (Auto) 7, Eosinophils (%) 

(Auto) 2, Basophils (%) (Auto) 1, Neutrophils # (Auto) 6.1, Lymphocytes # (Auto)

1.5, Monocytes # (Auto) 0.6, Eosinophils # (Auto) 0.2, Basophils # (Auto) 0.1, 

Immature Granulocyte # (Auto) 0.3H, Sodium Level 135, Potassium Level 4.0, 

Chloride Level 95L, Carbon Dioxide Level 28, Anion Gap 12, Blood Urea Nitrogen 

18, Creatinine 1.39H, Estimat Glomerular Filtration Rate 50, BUN/Creatinine 

Ratio 13, Glucose Level 163H, Calcium Level 9.2, Corrected Calcium 9.8, 

Magnesium Level 1.8, Total Bilirubin 0.6, Aspartate Amino Transf (AST/SGOT) 19, 

Alanine Aminotransferase (ALT/SGPT) 15, Alkaline Phosphatase 73, Total Protein 

7.4, Albumin 3.3


1/15/22 11:06: Glucometer 280H





Microbiology


1/10/22 MRSA Screen - Final, Complete


          MRSA not isolated


1/10/22 Blood Culture - Preliminary, Resulted


          No growth


1/10/22 Urine Culture - Final, Complete


          Staphylococcus epidermidis





Laboratory Tests


1/14/22 05:20








1/15/22 07:40











A/P:


Assessment:


RLL pneumonia


- management per medical services





Mild elevation in troponin level likely Type 2 MI secondary to hypoxia





Confusion


- likely secondary to hypoxia/sepsis - improving





Chronic dyspnea, history of obesity hypoventilation syndrome.  Moderate 

obstructive and restrictive lung defect on PFT in 2017.





History of nonischemic cardiomyopathy 


- Echo done by Dr. Hinds in March 2021 reported ejection fraction 45 to 50% 

with grade 1 diastolic dysfunction, moderate left atrial dilatation, PA pressure

35 to 40 mmHg


- Last stress test was done in March 2021 showing no evidence of ischemia or 

infarction, with ejection fraction 52%





History of CVA 


- in the remote past with transient slurred speech





Carotid dz


- Mild bilateral carotid stenosis, ultrasound was done in March 2021





Hypertension


- uncontrolled





Hyperlipidemia


- statin tx





Diabetes mellitus


- poorly controlled, managed by primary care physician





History of bilateral lower extremities edema 


- secondary to chronic venous insufficiency





History of chronic kidney disease stage III


- Followed by nephrologist as out pt





History of baseline incomplete left bundle branch block.





History of obstructive sleep apnea


- diagnosed in 2017


- non-compliant with tx





Hypothyroidism


- followed and managed by primary care physician





Gastroesophageal reflux disease





Depression


Plan:





Complex management due to multiple comorbidities


RLL pneumonia - management per medical services


BP has improved after adjustment of therapy


Monitor lab closely


Replace electrolytes as indicated











ELIAS HINDS MD FACP FAC CCDS   Curt 15, 2022 15:09

## 2022-01-15 NOTE — PROGRESS NOTE - HOSPITALIST
Subjective


HPI/CC On Admission


Date Seen by Provider:  Curt 15, 2022


Time Seen by Provider:  10:00


Chief Complaint: Altered mental status





HPI: This is an 86yoWM with a past medical history of chronic lower extremity 

edema and just overall declined status and chronic debility who presented to the

ER due to UTI with altered mental status and hypoxia with elevated Troponin. 

Cardiology has been consulted. We will transfer him down to 4th floor and 

maintain Telemetry and we will have social work review what options are for Home

Health Care. He reports he walks around with a walker and a wheelchair. He 

appears to be chronically ill.


Subjective/Events-last exam


Patient doing a lot better


Bowels are finally moving


No major issues


Ace wraps to legs are helping


Labs remained stable


Will check chest x-ray tomorrow


Family at bedside very pleased with his progress





Review of Systems


General:  Fatigue, Malaise


Pulmonary:  Dyspnea


Neurological:  Weakness





Objective


Exam


Vital Signs





Vital Signs








  Date Time  Temp Pulse Resp B/P (MAP) Pulse Ox O2 Delivery O2 Flow Rate FiO2


 


1/16/22 03:09 36.9 77 20 133/68 (89) 93 Nasal Cannula 2.00 


 


1/12/22 06:49        32





Capillary Refill : Less Than 3 Seconds


General Appearance:  No Apparent Distress, WD/WN, Chronically ill


Respiratory:  No Accessory Muscle Use, No Respiratory Distress, Decreased Breath

Sounds


Cardiovascular:  Regular Rate, Rhythm


Extremity:  Pedal Edema


Neurologic/Psychiatric:  Alert, Oriented x3, Disoriented





Results/Procedures


Lab


Laboratory Tests


1/15/22 07:40








Patient resulted labs reviewed.





Assessment/Plan


Assessment and Plan


Assess & Plan/Chief Complaint





Assessment:


Left lower lobe PNA


Sepsis


Fall


UTI


AMS


DM


TIA


Chronic lower extremity edema


Chronic debility


HTN


CKD


Elevated troponin





Plan:


Bowel regimen to maintain


Continue Ace wraps


Patient appears to be the best he can be


Advanced age and comorbidities preclude anything but a very poor prognosis


Patient is hospice candidate in my opinion


Unrealistic family expectations





Critical Care


Critically Ill Patient (see free text)





Diagnosis/Problems


Diagnosis/Problems





(1) Pneumonia


Status:  Acute


Qualifiers:  


   Pneumonia type:  due to unspecified organism  Laterality:  left  Lung 

location:  lower lobe of lung  Qualified Codes:  J18.9 - Pneumonia, unspecified 

organism


(2) Fall on same level


Status:  Acute


Qualifiers:  


   Encounter type:  initial encounter  Qualified Codes:  W18.30XA - Fall on same

level, unspecified, initial encounter


(3) Elevated troponin


Status:  Acute


(4) Altered mental status


Status:  Acute


Qualifiers:  


   Altered mental status type:  unspecified  Qualified Codes:  R41.82 - Altered 

mental status, unspecified


(5) Anasarca


Status:  Acute


(6) Urinary tract infection


Status:  Acute


Qualifiers:  


   Urinary tract infection type:  site unspecified  Hematuria presence:  without

hematuria  Qualified Codes:  N39.0 - Urinary tract infection, site not specified











CAROLYN MARIA DO                Curt 15, 2022 05:57

## 2022-01-16 VITALS — SYSTOLIC BLOOD PRESSURE: 127 MMHG | DIASTOLIC BLOOD PRESSURE: 60 MMHG

## 2022-01-16 VITALS — SYSTOLIC BLOOD PRESSURE: 120 MMHG | DIASTOLIC BLOOD PRESSURE: 58 MMHG

## 2022-01-16 VITALS — DIASTOLIC BLOOD PRESSURE: 66 MMHG | SYSTOLIC BLOOD PRESSURE: 141 MMHG

## 2022-01-16 VITALS — SYSTOLIC BLOOD PRESSURE: 116 MMHG | DIASTOLIC BLOOD PRESSURE: 70 MMHG

## 2022-01-16 VITALS — DIASTOLIC BLOOD PRESSURE: 67 MMHG | SYSTOLIC BLOOD PRESSURE: 145 MMHG

## 2022-01-16 VITALS — DIASTOLIC BLOOD PRESSURE: 68 MMHG | SYSTOLIC BLOOD PRESSURE: 133 MMHG

## 2022-01-16 VITALS — DIASTOLIC BLOOD PRESSURE: 56 MMHG | SYSTOLIC BLOOD PRESSURE: 114 MMHG

## 2022-01-16 LAB
ALBUMIN SERPL-MCNC: 3.1 GM/DL (ref 3.2–4.5)
ALP SERPL-CCNC: 66 U/L (ref 40–136)
ALT SERPL-CCNC: 14 U/L (ref 0–55)
BASOPHILS # BLD AUTO: 0 10^3/UL (ref 0–0.1)
BASOPHILS NFR BLD AUTO: 0 % (ref 0–10)
BILIRUB SERPL-MCNC: 0.5 MG/DL (ref 0.1–1)
BUN/CREAT SERPL: 12
CALCIUM SERPL-MCNC: 8.8 MG/DL (ref 8.5–10.1)
CHLORIDE SERPL-SCNC: 97 MMOL/L (ref 98–107)
CO2 SERPL-SCNC: 26 MMOL/L (ref 21–32)
CREAT SERPL-MCNC: 1.49 MG/DL (ref 0.6–1.3)
EOSINOPHIL # BLD AUTO: 0.2 10^3/UL (ref 0–0.3)
EOSINOPHIL NFR BLD AUTO: 2 % (ref 0–10)
GFR SERPLBLD BASED ON 1.73 SQ M-ARVRAT: 46 ML/MIN
GLUCOSE SERPL-MCNC: 172 MG/DL (ref 70–105)
HCT VFR BLD CALC: 37 % (ref 40–54)
HGB BLD-MCNC: 12.2 G/DL (ref 13.3–17.7)
LYMPHOCYTES # BLD AUTO: 1.8 10^3/UL (ref 1–4)
LYMPHOCYTES NFR BLD AUTO: 17 % (ref 12–44)
MAGNESIUM SERPL-MCNC: 1.7 MG/DL (ref 1.6–2.4)
MANUAL DIFFERENTIAL PERFORMED BLD QL: NO
MCH RBC QN AUTO: 30 PG (ref 25–34)
MCHC RBC AUTO-ENTMCNC: 33 G/DL (ref 32–36)
MCV RBC AUTO: 89 FL (ref 80–99)
MONOCYTES # BLD AUTO: 0.7 10^3/UL (ref 0–1)
MONOCYTES NFR BLD AUTO: 7 % (ref 0–12)
NEUTROPHILS # BLD AUTO: 7.6 10^3/UL (ref 1.8–7.8)
NEUTROPHILS NFR BLD AUTO: 73 % (ref 42–75)
PLATELET # BLD: 236 10^3/UL (ref 130–400)
PMV BLD AUTO: 10.1 FL (ref 9–12.2)
POTASSIUM SERPL-SCNC: 4.2 MMOL/L (ref 3.6–5)
PROT SERPL-MCNC: 6.9 GM/DL (ref 6.4–8.2)
SODIUM SERPL-SCNC: 137 MMOL/L (ref 135–145)
WBC # BLD AUTO: 10.5 10^3/UL (ref 4.3–11)

## 2022-01-16 RX ADMIN — TAMSULOSIN HYDROCHLORIDE SCH MG: 0.4 CAPSULE ORAL at 08:14

## 2022-01-16 RX ADMIN — METOPROLOL SUCCINATE SCH MG: 50 TABLET, EXTENDED RELEASE ORAL at 08:14

## 2022-01-16 RX ADMIN — MECLIZINE HYDROCHLORIDE SCH MG: 25 TABLET ORAL at 14:09

## 2022-01-16 RX ADMIN — ENOXAPARIN SODIUM SCH MG: 100 INJECTION SUBCUTANEOUS at 11:56

## 2022-01-16 RX ADMIN — INSULIN ASPART SCH UNIT: 100 INJECTION, SOLUTION INTRAVENOUS; SUBCUTANEOUS at 11:55

## 2022-01-16 RX ADMIN — ENALAPRIL MALEATE SCH MG: 5 TABLET ORAL at 08:16

## 2022-01-16 RX ADMIN — INSULIN ASPART SCH UNIT: 100 INJECTION, SOLUTION INTRAVENOUS; SUBCUTANEOUS at 17:16

## 2022-01-16 RX ADMIN — Medication SCH EA: at 05:39

## 2022-01-16 RX ADMIN — AMLODIPINE BESYLATE SCH MG: 5 TABLET ORAL at 08:14

## 2022-01-16 RX ADMIN — SODIUM CHLORIDE SCH MLS/HR: 900 INJECTION INTRAVENOUS at 18:36

## 2022-01-16 RX ADMIN — ENALAPRIL MALEATE SCH MG: 5 TABLET ORAL at 20:25

## 2022-01-16 RX ADMIN — Medication SCH MCG: at 08:13

## 2022-01-16 RX ADMIN — IPRATROPIUM BROMIDE AND ALBUTEROL SULFATE SCH ML: .5; 3 SOLUTION RESPIRATORY (INHALATION) at 03:06

## 2022-01-16 RX ADMIN — PANTOPRAZOLE SODIUM SCH MG: 40 TABLET, DELAYED RELEASE ORAL at 17:16

## 2022-01-16 RX ADMIN — IPRATROPIUM BROMIDE AND ALBUTEROL SULFATE SCH ML: .5; 3 SOLUTION RESPIRATORY (INHALATION) at 15:06

## 2022-01-16 RX ADMIN — IPRATROPIUM BROMIDE AND ALBUTEROL SULFATE SCH ML: .5; 3 SOLUTION RESPIRATORY (INHALATION) at 10:01

## 2022-01-16 RX ADMIN — CLOPIDOGREL BISULFATE SCH MG: 75 TABLET, FILM COATED ORAL at 08:14

## 2022-01-16 RX ADMIN — SODIUM CHLORIDE SCH MLS/HR: 900 INJECTION INTRAVENOUS at 03:29

## 2022-01-16 RX ADMIN — LACTULOSE SCH GM: 20 SOLUTION ORAL at 20:25

## 2022-01-16 RX ADMIN — MECLIZINE HYDROCHLORIDE SCH MG: 25 TABLET ORAL at 08:16

## 2022-01-16 RX ADMIN — INSULIN ASPART SCH UNIT: 100 INJECTION, SOLUTION INTRAVENOUS; SUBCUTANEOUS at 21:33

## 2022-01-16 RX ADMIN — ASPIRIN SCH MG: 81 TABLET ORAL at 08:13

## 2022-01-16 RX ADMIN — SENNOSIDES SCH MG: 8.6 TABLET, FILM COATED ORAL at 08:14

## 2022-01-16 RX ADMIN — OMEGA-3 FATTY ACIDS CAP 1000 MG SCH MG: 1000 CAP at 08:14

## 2022-01-16 RX ADMIN — MECLIZINE HYDROCHLORIDE SCH MG: 25 TABLET ORAL at 20:25

## 2022-01-16 RX ADMIN — LEVOTHYROXINE SODIUM SCH MCG: 150 TABLET ORAL at 05:39

## 2022-01-16 RX ADMIN — Medication SCH ML: at 14:09

## 2022-01-16 RX ADMIN — Medication SCH ML: at 03:29

## 2022-01-16 RX ADMIN — SIMVASTATIN SCH MG: 40 TABLET, FILM COATED ORAL at 08:14

## 2022-01-16 RX ADMIN — Medication SCH ML: at 20:26

## 2022-01-16 RX ADMIN — SODIUM CHLORIDE SCH MLS/HR: 900 INJECTION INTRAVENOUS at 11:55

## 2022-01-16 RX ADMIN — SENNOSIDES SCH MG: 8.6 TABLET, FILM COATED ORAL at 20:25

## 2022-01-16 RX ADMIN — LACTULOSE SCH GM: 20 SOLUTION ORAL at 08:13

## 2022-01-16 RX ADMIN — INSULIN ASPART SCH UNIT: 100 INJECTION, SOLUTION INTRAVENOUS; SUBCUTANEOUS at 05:56

## 2022-01-16 RX ADMIN — SENNOSIDES SCH MG: 8.6 TABLET, FILM COATED ORAL at 14:09

## 2022-01-16 RX ADMIN — SPIRONOLACTONE SCH MG: 25 TABLET ORAL at 08:14

## 2022-01-16 RX ADMIN — IPRATROPIUM BROMIDE AND ALBUTEROL SULFATE SCH ML: .5; 3 SOLUTION RESPIRATORY (INHALATION) at 22:17

## 2022-01-16 NOTE — PROGRESS NOTE - CARDIOLOGY
Cardiology SOAP Progress Note


Subjective:


Gen weakness and malaise present


No cp or palp or syncope


Shortness of breath with activity


No n/v/d





Objective:


I&O/Vital Signs











 1/16/22 1/16/22 1/16/22 1/16/22





 07:31 09:00 10:01 11:51


 


Temp 37.2   36.9


 


Pulse 81   72


 


Resp 18   18


 


B/P (MAP) 127/60 (82)   114/56 (75)


 


Pulse Ox 93  93 96


 


O2 Delivery Nasal Cannula Nasal Cannula Nasal Cannula Nasal Cannula


 


O2 Flow Rate 2.00 2.00 2.00 2.00


 


    





 1/16/22 1/16/22  





 15:06 15:19  


 


Temp  37.2  


 


Pulse  70  


 


Resp  20  


 


B/P (MAP)  145/67 (93)  


 


Pulse Ox 93 98  


 


O2 Delivery Nasal Cannula Nasal Cannula  


 


O2 Flow Rate 2.00 2.00  














 1/16/22





 00:00


 


Intake Total 531 ml


 


Output Total 1360 ml


 


Balance -829 ml








Weight (Pounds):  241


Weight (Ounces):  0.0


Weight (Calculated Kilograms):  109.855137


Constitutional:  No AAO x 3 (Does not respond to questions; opens eyes); well-

developed, well-nourished, other ([place])


Respiratory:  No accessory muscle use, No respiratory distress; chest expansion 

is symmetric, chest is bilaterally symmetric, other (fair air entry)


Cardiovascular:  regular rate-rhythm; No JVD; S1 and S2


Gastrointestional:  No tender; soft, round, audible bowel sounds


Extremities:  swelling (mod leg swelling with wraps in place); No clubbing, No 

cyanosis


Neurologic/Psychiatric:  other (moves all limbs equally)


Skin:  No rash on exposed areas, No ulcerations on exposed areas





Results/Procedures:


Labs


Laboratory Tests


1/15/22 19:57: Glucometer 224H


1/16/22 06:00: 


White Blood Count 10.5, Red Blood Count 4.10L, Hemoglobin 12.2L, Hematocrit 37L,

Mean Corpuscular Volume 89, Mean Corpuscular Hemoglobin 30, Mean Corpuscular 

Hemoglobin Concent 33, Red Cell Distribution Width 12.1, Platelet Count 236, 

Mean Platelet Volume 10.1, Immature Granulocyte % (Auto) 2, Neutrophils (%) 

(Auto) 73, Lymphocytes (%) (Auto) 17, Monocytes (%) (Auto) 7, Eosinophils (%) 

(Auto) 2, Basophils (%) (Auto) 0, Neutrophils # (Auto) 7.6, Lymphocytes # (Auto)

1.8, Monocytes # (Auto) 0.7, Eosinophils # (Auto) 0.2, Basophils # (Auto) 0.0, 

Immature Granulocyte # (Auto) 0.2H, Sodium Level 137, Potassium Level 4.2, 

Chloride Level 97L, Carbon Dioxide Level 26, Anion Gap 14, Blood Urea Nitrogen 

18, Creatinine 1.49H, Estimat Glomerular Filtration Rate 46, BUN/Creatinine 

Ratio 12, Glucose Level 172H, Calcium Level 8.8, Corrected Calcium 9.5, 

Magnesium Level 1.7, Total Bilirubin 0.5, Aspartate Amino Transf (AST/SGOT) 20, 

Alanine Aminotransferase (ALT/SGPT) 14, Alkaline Phosphatase 66, Total Protein 

6.9, Albumin 3.1L


1/16/22 10:09: Glucometer 251H


1/16/22 15:22: Glucometer 208H





Microbiology


1/10/22 MRSA Screen - Final, Complete


          MRSA not isolated


1/10/22 Blood Culture - Preliminary, Resulted


          No growth


1/10/22 Urine Culture - Final, Complete


          Staphylococcus epidermidis





Laboratory Tests


1/15/22 07:40








1/16/22 06:00











A/P:


Assessment:


RLL pneumonia


- management per medical services





Mild elevation in troponin level likely Type 2 MI secondary to hypoxia





Confusion


- likely secondary to hypoxia/sepsis - improving





Chronic dyspnea, history of obesity hypoventilation syndrome.  Moderate 

obstructive and restrictive lung defect on PFT in 2017.





History of nonischemic cardiomyopathy 


- Echo done by Dr. Hinds in March 2021 reported ejection fraction 45 to 50% 

with grade 1 diastolic dysfunction, moderate left atrial dilatation, PA pressure

35 to 40 mmHg


- Last stress test was done in March 2021 showing no evidence of ischemia or 

infarction, with ejection fraction 52%





History of CVA 


- in the remote past with transient slurred speech





Carotid dz


- Mild bilateral carotid stenosis, ultrasound was done in March 2021





Hypertension


- uncontrolled





Hyperlipidemia


- statin tx





Diabetes mellitus


- poorly controlled, managed by primary care physician





History of bilateral lower extremities edema 


- secondary to chronic venous insufficiency





History of chronic kidney disease stage III


- Followed by nephrologist as out pt





History of baseline incomplete left bundle branch block.





History of obstructive sleep apnea


- diagnosed in 2017


- non-compliant with tx





Hypothyroidism


- followed and managed by primary care physician





Gastroesophageal reflux disease





Depression


Plan:





RLL pneumonia - management per medical services


BP better


Monitor labs











ELIAS HINDS MD FACP FAC CCDS   Jan 16, 2022 16:06

## 2022-01-16 NOTE — DIAGNOSTIC IMAGING REPORT
CHEST 1 VIEW, AP/PA ONLY



Indication: Pneumonia



Comparison: 01/10/2022



Findings:

Worsening of bibasilar irregular pulmonary consolidations. Small

left pleural effusion persists. No pneumothorax. Stable cardiac

silhouette.



Impression: 

1. Progression of bibasilar opacities likely due to multifocal

pneumonia, as per provided history. Advise followup PA and

lateral chest radiographs in 4 weeks after appropriate medical

management to ensure resolution.



Dictated by: 



  Dictated on workstation # [id number]

## 2022-01-16 NOTE — PROGRESS NOTE - HOSPITALIST
Subjective


HPI/CC On Admission


Date Seen by Provider:  Jan 16, 2022


Time Seen by Provider:  10:00


Chief Complaint: Altered mental status





HPI: This is an 86yoWM with a past medical history of chronic lower extremity 

edema and just overall declined status and chronic debility who presented to the

ER due to UTI with altered mental status and hypoxia with elevated Troponin. 

Cardiology has been consulted. We will transfer him down to 4th floor and 

maintain Telemetry and we will have social work review what options are for Home

Health Care. He reports he walks around with a walker and a wheelchair. He 

appears to be chronically ill.


Subjective/Events-last exam


Patient doing a bit better


Labs are okay


Eating okay


Bowels are moving okay


Chest x-ray reviewed


Antibiotics still maintained


Ace wraps on lower legs are improving the edema


Ready for discharge tomorrow but poor prognosis


Really needs nursing home but wife declines





Review of Systems


General:  Fatigue, Malaise


Neurological:  Weakness





Objective


Exam


Vital Signs





Vital Signs








  Date Time  Temp Pulse Resp B/P (MAP) Pulse Ox O2 Delivery O2 Flow Rate FiO2


 


1/17/22 03:05 36.3 67 22 123/59 (80) 95 Nasal Cannula 2.00 


 


1/12/22 06:49        32





Capillary Refill : Less Than 3 Seconds


General Appearance:  No Apparent Distress, WD/WN, Chronically ill


Respiratory:  Lungs Clear, Normal Breath Sounds, Decreased Breath Sounds


Cardiovascular:  Regular Rate, Rhythm


Neurologic/Psychiatric:  Alert, Oriented x3





Results/Procedures


Lab


Laboratory Tests


1/16/22 06:00








Patient resulted labs reviewed.





Assessment/Plan


Assessment and Plan


Assess & Plan/Chief Complaint





Assessment:


Left lower lobe PNA


Sepsis


Fall


UTI


AMS


DM


TIA


Chronic lower extremity edema


Chronic debility


HTN


CKD


Elevated troponin





Plan:


Bowel regimen to maintain


Continue Ace wraps


Patient appears to be the best he can be


Advanced age and comorbidities preclude anything but a very poor prognosis


Patient is hospice candidate in my opinion


Unrealistic family expectations





1/16/2022:


Supportive care


Monitor labs


Wrap legs


Antibiotics





Critical Care


Critically Ill Patient (see free text)





Diagnosis/Problems


Diagnosis/Problems





(1) Pneumonia


Status:  Acute


Qualifiers:  


   Pneumonia type:  due to unspecified organism  Laterality:  left  Lung 

location:  lower lobe of lung  Qualified Codes:  J18.9 - Pneumonia, unspecified 

organism


(2) Fall on same level


Status:  Acute


Qualifiers:  


   Encounter type:  initial encounter  Qualified Codes:  W18.30XA - Fall on same

level, unspecified, initial encounter


(3) Elevated troponin


Status:  Acute


(4) Altered mental status


Status:  Acute


Qualifiers:  


   Altered mental status type:  unspecified  Qualified Codes:  R41.82 - Altered 

mental status, unspecified


(5) Anasarca


Status:  Acute


(6) Urinary tract infection


Status:  Acute


Qualifiers:  


   Urinary tract infection type:  site unspecified  Hematuria presence:  without

hematuria  Qualified Codes:  N39.0 - Urinary tract infection, site not specified











CAROLYN MARIA DO                Jan 16, 2022 06:15

## 2022-01-17 VITALS — SYSTOLIC BLOOD PRESSURE: 141 MMHG | DIASTOLIC BLOOD PRESSURE: 66 MMHG

## 2022-01-17 VITALS — DIASTOLIC BLOOD PRESSURE: 57 MMHG | SYSTOLIC BLOOD PRESSURE: 123 MMHG

## 2022-01-17 VITALS — DIASTOLIC BLOOD PRESSURE: 72 MMHG | SYSTOLIC BLOOD PRESSURE: 165 MMHG

## 2022-01-17 VITALS — DIASTOLIC BLOOD PRESSURE: 59 MMHG | SYSTOLIC BLOOD PRESSURE: 123 MMHG

## 2022-01-17 VITALS — DIASTOLIC BLOOD PRESSURE: 73 MMHG | SYSTOLIC BLOOD PRESSURE: 167 MMHG

## 2022-01-17 LAB
ALBUMIN SERPL-MCNC: 3.1 GM/DL (ref 3.2–4.5)
ALP SERPL-CCNC: 69 U/L (ref 40–136)
ALT SERPL-CCNC: 13 U/L (ref 0–55)
BASOPHILS # BLD AUTO: 0 10^3/UL (ref 0–0.1)
BASOPHILS NFR BLD AUTO: 0 % (ref 0–10)
BILIRUB SERPL-MCNC: 0.5 MG/DL (ref 0.1–1)
BUN/CREAT SERPL: 12
CALCIUM SERPL-MCNC: 8.8 MG/DL (ref 8.5–10.1)
CHLORIDE SERPL-SCNC: 98 MMOL/L (ref 98–107)
CO2 SERPL-SCNC: 28 MMOL/L (ref 21–32)
CREAT SERPL-MCNC: 1.67 MG/DL (ref 0.6–1.3)
EOSINOPHIL # BLD AUTO: 0.2 10^3/UL (ref 0–0.3)
EOSINOPHIL NFR BLD AUTO: 2 % (ref 0–10)
GFR SERPLBLD BASED ON 1.73 SQ M-ARVRAT: 40 ML/MIN
GLUCOSE SERPL-MCNC: 137 MG/DL (ref 70–105)
HCT VFR BLD CALC: 34 % (ref 40–54)
HGB BLD-MCNC: 11.3 G/DL (ref 13.3–17.7)
LYMPHOCYTES # BLD AUTO: 2 10^3/UL (ref 1–4)
LYMPHOCYTES NFR BLD AUTO: 20 % (ref 12–44)
MAGNESIUM SERPL-MCNC: 1.7 MG/DL (ref 1.6–2.4)
MANUAL DIFFERENTIAL PERFORMED BLD QL: NO
MCH RBC QN AUTO: 30 PG (ref 25–34)
MCHC RBC AUTO-ENTMCNC: 33 G/DL (ref 32–36)
MCV RBC AUTO: 92 FL (ref 80–99)
MONOCYTES # BLD AUTO: 0.6 10^3/UL (ref 0–1)
MONOCYTES NFR BLD AUTO: 6 % (ref 0–12)
NEUTROPHILS # BLD AUTO: 6.9 10^3/UL (ref 1.8–7.8)
NEUTROPHILS NFR BLD AUTO: 69 % (ref 42–75)
PLATELET # BLD: 246 10^3/UL (ref 130–400)
PMV BLD AUTO: 10.3 FL (ref 9–12.2)
POTASSIUM SERPL-SCNC: 3.9 MMOL/L (ref 3.6–5)
PROT SERPL-MCNC: 6.7 GM/DL (ref 6.4–8.2)
SODIUM SERPL-SCNC: 135 MMOL/L (ref 135–145)
WBC # BLD AUTO: 10 10^3/UL (ref 4.3–11)

## 2022-01-17 RX ADMIN — Medication SCH MCG: at 08:52

## 2022-01-17 RX ADMIN — INSULIN ASPART SCH UNIT: 100 INJECTION, SOLUTION INTRAVENOUS; SUBCUTANEOUS at 11:29

## 2022-01-17 RX ADMIN — SENNOSIDES SCH MG: 8.6 TABLET, FILM COATED ORAL at 08:52

## 2022-01-17 RX ADMIN — IPRATROPIUM BROMIDE AND ALBUTEROL SULFATE SCH ML: .5; 3 SOLUTION RESPIRATORY (INHALATION) at 02:54

## 2022-01-17 RX ADMIN — SPIRONOLACTONE SCH MG: 25 TABLET ORAL at 08:52

## 2022-01-17 RX ADMIN — INSULIN ASPART SCH UNIT: 100 INJECTION, SOLUTION INTRAVENOUS; SUBCUTANEOUS at 16:40

## 2022-01-17 RX ADMIN — LACTULOSE SCH GM: 20 SOLUTION ORAL at 08:53

## 2022-01-17 RX ADMIN — TAMSULOSIN HYDROCHLORIDE SCH MG: 0.4 CAPSULE ORAL at 08:53

## 2022-01-17 RX ADMIN — SODIUM CHLORIDE SCH MLS/HR: 900 INJECTION INTRAVENOUS at 11:30

## 2022-01-17 RX ADMIN — Medication SCH ML: at 05:40

## 2022-01-17 RX ADMIN — METOPROLOL SUCCINATE SCH MG: 50 TABLET, EXTENDED RELEASE ORAL at 08:53

## 2022-01-17 RX ADMIN — CLOPIDOGREL BISULFATE SCH MG: 75 TABLET, FILM COATED ORAL at 08:52

## 2022-01-17 RX ADMIN — AMLODIPINE BESYLATE SCH MG: 5 TABLET ORAL at 08:52

## 2022-01-17 RX ADMIN — ENALAPRIL MALEATE SCH MG: 5 TABLET ORAL at 08:52

## 2022-01-17 RX ADMIN — Medication SCH ML: at 13:17

## 2022-01-17 RX ADMIN — SENNOSIDES SCH MG: 8.6 TABLET, FILM COATED ORAL at 13:17

## 2022-01-17 RX ADMIN — ASPIRIN SCH MG: 81 TABLET ORAL at 08:52

## 2022-01-17 RX ADMIN — SIMVASTATIN SCH MG: 40 TABLET, FILM COATED ORAL at 08:53

## 2022-01-17 RX ADMIN — OMEGA-3 FATTY ACIDS CAP 1000 MG SCH MG: 1000 CAP at 08:52

## 2022-01-17 RX ADMIN — LEVOTHYROXINE SODIUM SCH MCG: 150 TABLET ORAL at 05:41

## 2022-01-17 RX ADMIN — MECLIZINE HYDROCHLORIDE SCH MG: 25 TABLET ORAL at 13:17

## 2022-01-17 RX ADMIN — Medication SCH EA: at 05:40

## 2022-01-17 RX ADMIN — SODIUM CHLORIDE SCH MLS/HR: 900 INJECTION INTRAVENOUS at 02:40

## 2022-01-17 RX ADMIN — ENOXAPARIN SODIUM SCH MG: 100 INJECTION SUBCUTANEOUS at 11:29

## 2022-01-17 RX ADMIN — INSULIN ASPART SCH UNIT: 100 INJECTION, SOLUTION INTRAVENOUS; SUBCUTANEOUS at 05:41

## 2022-01-17 RX ADMIN — MECLIZINE HYDROCHLORIDE SCH MG: 25 TABLET ORAL at 08:52

## 2022-01-17 RX ADMIN — IPRATROPIUM BROMIDE AND ALBUTEROL SULFATE SCH ML: .5; 3 SOLUTION RESPIRATORY (INHALATION) at 07:43

## 2022-01-17 NOTE — OCCUPATIONAL THER DAILY NOTE
OT Current Status-Daily Note


Subjective


Pt alert, sitting in recliner.  Nrsg present in room.  Pt agrees to therapy.  No

c/o pain.





Mental Status/Objective


Patient Orientation:  Person, Place, Time, Situation


Attachments:  IV





ADL-Treatment


Dependent, max A x2, for toileting and lower body dressing.  Max A x2 for sit to

stand then using FWW and Assist x2 pt able to shuffle feet and go from recliner 

to BSC.  Pt requires verbal and physical cues for correct position to stand then

cues to stand upright and keep FWW close.  After therapy, pt sitting on BSC with

call light in reach.  Nrsg aware of pt's position.


Therapy Code Descriptions/Definitions 





Functional [place] Measure:


0=Not Assessed/NA        4=Minimal Assistance


1=Total Assistance        5=Supervision or Setup


2=Maximal Assistance  6=Modified [place]


3=Moderate Assistance 7=Complete IndependenceSCALE: Activities may be completed 

with or without assistive devices.





6-Indepedent-patient completes the activity by him/herself with no assistance 

from a helper.


5-Set-up or Clean-up Assistance-helper sets up or cleans up; patient completes 

activity. [place] assists only prior to or  


    following the activity.


4-Supervision or Touching Assistance-helper provides verbal cues and/or 

touching/steadying and/or contact guard assistance as patient completes 

activity. Assistance may be provided   


    throughout the activity or intermittently.


3-Partial/Moderate Assistance-helper does LESS THAN HALF the effort. [place] 

lifts, holds or supports trunk or limbs, but provides less than half the effort.


2-Substantial/Maximal Assistance-helper does MORE THAN HALF the effort. [place] 

lifts or holds trunk or limbs and provides more than half the effort.


7-Kqkwqzgoo-cvbgdh does ALL the effort. Patient does none of the effort to 

complete the activity. Or, the assistance of 2 or more helpers is required for 

the patient to complete the  


    activity.


If activity was not attempted, code reason:


7-Patient Refused.


9-Not Applicable-not attempted and the patient did not perform the activity 

before the current illness, exacerbation or injury.


10-Not Attempted due to Environmental Limitations-(lack of equipment, weather 

restraints, etc.).


88-Not Attempted due to Medical Conditions or Safety Concerns.


Lower Body Dressing (QC):  1


Toileting Hygiene (QC):  1





OT Long Term Goals


Long Term Goals


Time Frame:  Jan 28, 2022


Oral Hygiene (QC):  4


Toileting Hygiene (QC):  3


Shower/Bathe Self (QC):  2


Upper Body Dressing (QC):  3


Lower Body Dressing (QC):  3


1=Demonstrate adherence to instructed precautions during ADL tasks.


2=Patient will verbalize/demonstrate understanding of assistive 

devices/modifications for ADL.


3=Patient will improve strength/tolerance for activity to enable patient to 

perform ADL's.





OT Education/Plan


Problem List/Assessment


Assessment:  Decreased Activ Tolerance, Decreased UE Strength, Impaired Funct Ba

kim (retropulsive), Impaired Self-Care Skills





Discharge Recommendations


Plan/Recommendations:  Continue POC





Treatment Plan/Plan of Care


Patient would benefit from OT for education, treatment and training to promote 

independence in ADL's, mobility, safety and/or upper extremity function for 

ADL's.


Plan of Care:  ADL Retraining, Caregiver Training, Functional Mobility, UE Funct

Exercise/Act


Treatment Duration:  Jan 25, 2022


Frequency:  3 times per week


Estimated Hrs Per Day:  .25 hour per day


Agreement:  Yes


Rehab Potential:  Poor





Time/GCodes


Start Time:  11:30


Stop Time:  11:40


Total Time Billed (hr/min):  10


Billed Treatment Time


1 visit-ADL 1 (10 min)











GAYATHRI SNELL               Jan 17, 2022 14:26

## 2022-01-17 NOTE — D/C HH FACE TO FACE ORDER
D/C  Face to Face Orders


Reconcile Patient Problems


Problems Reviewed?:  Yes





Instructions for Patient


[facility] [facility], [phone]


Patient Instructions/FollowUp:  


one week with PCP


Physician to follow Patient:  Khari


Discharge Diet for Home:  Low Sodium Diet





Patient Data-Allergies,Ht & Wt


Patient Allergies:  


Coded Allergies:  


     No Known Drug Allergies (Unverified , 10/14/10)


Height (Feet):  5


Height (Inches):  8.00


Weight (Pounds):  241


Weight (Ounces):  0.0





Home Health Need/Face to Face


Date of Face to Face:  Jan 17, 2022


Clinical Findings:  Instability, Muscle weakness, Unsteady gait


I have seen Pt face-to-face:  Yes


Discharged To:  Home


Diagnosis/Conditions:  


Chronic lower extremity edema


Hypertension


Type 2 diabetes mellitus


Debility


Problems/Diagnosis/Condition:  


(1) HTN (hypertension)


(2) T2DM (type 2 diabetes mellitus)


(3) Debility


(4) Bilateral lower extremity edema


Patient is Homebound due to:  [place] fall risk due to instabilty, Muscle weakness

, Shortness of breath/distress


Homebound Status


   Due to the above stated illness, injury or surgical procedure (medical 

condition or diagnosis) and associated clinical findings, the patient is 

homebound because of his/her inability to leave home except with aid of a 

supportive device and/or person AND leaving the home requires a considerable and

taxing effort or is medically contraindicated.


Pt req the following assistanc:  Aid of another person





Home Health Nursing Orders


Home Health Services Order:  Nursing Services, Occupational Ther-Evaluate & 

Treat, Physical Therapy-Evaluate & Treat, Wound Care-Eval/Treat





Therapy Orders


Therapy Orders:  OT (must have SN or PT order), Physical Therapy


Therapy Specific Orders:  Eval assistive deivces, Teach enviro 

modifications/safety, Gait training, Increase strength/endurance


Certify Stmt


I certify that this patient is under my care and that I, a nurse practitioner or

a physician; a assistant working with me, had a face to face encounter that -

meets the physician face to face encounter requirements with this patient as 

dated.











LUZ MARINA KEN MD              Jan 17, 2022 12:35

## 2022-01-17 NOTE — PROGRESS NOTE - CARDIOLOGY
Cardiology SOAP Progress Note


Subjective:


Gen weakness and malaise present


No cp or palp or syncope


Shortness of breath with activity


No n/v/d





Objective:


I&O/Vital Signs











 1/17/22 1/17/22 1/17/22 1/17/22





 03:05 07:43 07:52 08:00


 


Temp 36.3  36.6 


 


Pulse 67  59 


 


Resp 22  20 


 


B/P (MAP) 123/59 (80)  167/73 (104) 


 


Pulse Ox 95 94 95 95


 


O2 Delivery Nasal Cannula Nasal Cannula Nasal Cannula Nasal Cannula


 


O2 Flow Rate 2.00 2.00 2.00 2.00


 


    





 1/17/22   





 12:05   


 


Temp 36.8   


 


Pulse 68   


 


Resp 18   


 


B/P (MAP) 165/72 (103)   


 


Pulse Ox 96   


 


O2 Delivery Nasal Cannula   


 


O2 Flow Rate 2.00   














 1/17/22





 00:00


 


Intake Total 1000 ml


 


Output Total 500 ml


 


Balance 500 ml








Weight (Pounds):  241


Weight (Ounces):  0.0


Weight (Calculated Kilograms):  109.188145


Constitutional:  No AAO x 3 (Does not respond to questions; opens eyes); well-

developed, well-nourished, other ([place])


Respiratory:  No accessory muscle use, No respiratory distress; chest expansion 

is symmetric, chest is bilaterally symmetric, other (fair air entry)


Cardiovascular:  regular rate-rhythm; No JVD; S1 and S2


Gastrointestional:  No tender; soft, round, audible bowel sounds


Extremities:  swelling (mod leg swelling with wraps in place); No clubbing, No 

cyanosis


Neurologic/Psychiatric:  other (moves all limbs equally)


Skin:  No rash on exposed areas, No ulcerations on exposed areas





Results/Procedures:


Labs


Laboratory Tests


1/16/22 15:22: Glucometer 208H


1/16/22 20:48: Glucometer 213H


1/17/22 04:43: Glucometer 144H


1/17/22 05:30: 


White Blood Count 10.0, Red Blood Count 3.74L, Hemoglobin 11.3L, Hematocrit 34L,

Mean Corpuscular Volume 92, Mean Corpuscular Hemoglobin 30, Mean Corpuscular 

Hemoglobin Concent 33, Red Cell Distribution Width 12.4, Platelet Count 246, 

Mean Platelet Volume 10.3, Immature Granulocyte % (Auto) 2, Neutrophils (%) 

(Auto) 69, Lymphocytes (%) (Auto) 20, Monocytes (%) (Auto) 6, Eosinophils (%) 

(Auto) 2, Basophils (%) (Auto) 0, Neutrophils # (Auto) 6.9, Lymphocytes # (Auto)

2.0, Monocytes # (Auto) 0.6, Eosinophils # (Auto) 0.2, Basophils # (Auto) 0.0, 

Immature Granulocyte # (Auto) 0.2H, Sodium Level 135, Potassium Level 3.9, 

Chloride Level 98, Carbon Dioxide Level 28, Anion Gap 9, Blood Urea Nitrogen 20H

, Creatinine 1.67H, Estimat Glomerular Filtration Rate 40, BUN/Creatinine Ratio 

12, Glucose Level 137H, Calcium Level 8.8, Corrected Calcium 9.5, Magnesium 

Level 1.7, Total Bilirubin 0.5, Aspartate Amino Transf (AST/SGOT) 19, Alanine 

Aminotransferase (ALT/SGPT) 13, Alkaline Phosphatase 69, Total Protein 6.7, 

Albumin 3.1L


1/17/22 10:07: Glucometer 183H





Microbiology


1/10/22 MRSA Screen - Final, Complete


          MRSA not isolated


1/10/22 Blood Culture - Final, Complete


          No growth


1/10/22 Urine Culture - Final, Complete


          Staphylococcus epidermidis





Laboratory Tests


1/16/22 06:00








1/17/22 05:30











A/P:


Assessment:


RLL pneumonia


- management per medical services





Mild elevation in troponin level likely Type 2 MI secondary to hypoxia





Confusion


- likely secondary to hypoxia/sepsis - improving





Chronic dyspnea, history of obesity hypoventilation syndrome.  Moderate 

obstructive and restrictive lung defect on PFT in 2017.





History of nonischemic cardiomyopathy 


- Echo done by Dr. Hinds in March 2021 reported ejection fraction 45 to 50% 

with grade 1 diastolic dysfunction, moderate left atrial dilatation, PA pressure

35 to 40 mmHg


- Last stress test was done in March 2021 showing no evidence of ischemia or 

infarction, with ejection fraction 52%





History of CVA 


- in the remote past with transient slurred speech





Carotid dz


- Mild bilateral carotid stenosis, ultrasound was done in March 2021





Hypertension


- uncontrolled





Hyperlipidemia


- statin tx





Diabetes mellitus


- poorly controlled, managed by primary care physician





History of bilateral lower extremities edema 


- secondary to chronic venous insufficiency





History of chronic kidney disease stage III


- Followed by nephrologist as out pt





History of baseline incomplete left bundle branch block.





History of obstructive sleep apnea


- diagnosed in 2017


- non-compliant with tx





Hypothyroidism


- followed and managed by primary care physician





Gastroesophageal reflux disease





Depression


Plan:





RLL pneumonia - management per medical services


BP better


Monitor labs











ELIAS HINDS MD FACP FACC CCDS   Jan 17, 2022 14:57